# Patient Record
Sex: FEMALE | Race: WHITE | NOT HISPANIC OR LATINO | Employment: FULL TIME
[De-identification: names, ages, dates, MRNs, and addresses within clinical notes are randomized per-mention and may not be internally consistent; named-entity substitution may affect disease eponyms.]

---

## 2017-11-12 ENCOUNTER — HEALTH MAINTENANCE LETTER (OUTPATIENT)
Age: 27
End: 2017-11-12

## 2019-01-07 ENCOUNTER — OFFICE VISIT (OUTPATIENT)
Dept: FAMILY MEDICINE | Facility: CLINIC | Age: 29
End: 2019-01-07
Payer: COMMERCIAL

## 2019-01-07 VITALS
DIASTOLIC BLOOD PRESSURE: 72 MMHG | SYSTOLIC BLOOD PRESSURE: 120 MMHG | RESPIRATION RATE: 18 BRPM | BODY MASS INDEX: 22.29 KG/M2 | TEMPERATURE: 98.3 F | OXYGEN SATURATION: 99 % | HEART RATE: 82 BPM | HEIGHT: 67 IN | WEIGHT: 142 LBS

## 2019-01-07 DIAGNOSIS — Z71.84 TRAVEL ADVICE ENCOUNTER: ICD-10-CM

## 2019-01-07 DIAGNOSIS — Z32.01 POSITIVE PREGNANCY TEST: Primary | ICD-10-CM

## 2019-01-07 LAB — HCG UR QL: POSITIVE

## 2019-01-07 PROCEDURE — 81025 URINE PREGNANCY TEST: CPT | Performed by: NURSE PRACTITIONER

## 2019-01-07 PROCEDURE — 99213 OFFICE O/P EST LOW 20 MIN: CPT | Performed by: NURSE PRACTITIONER

## 2019-01-07 ASSESSMENT — MIFFLIN-ST. JEOR: SCORE: 1388.8

## 2019-01-07 ASSESSMENT — PAIN SCALES - GENERAL: PAINLEVEL: MILD PAIN (2)

## 2019-01-07 NOTE — PATIENT INSTRUCTIONS
-Take your prenatal vitamin every day (with 400-800 mcg of folic acid)  -Maintain a healthy weight  -Exercise regularly, eat a well-balanced diet, avoid alcohol and potentially dangerous chemicals, and drink caffeine only in moderation (<250 mg) daily  -Avoid tobacco products  -Avoid  ibuprofen, motrin, advil, aleve, or naproxen; can take tylenol (intermittently, not in excess) for pain  -Avoid scooping connie litter (if you do, wash hands thoroughly afterwards)  -Avoid raw meats and fish  -Heat lunch meat to steaming or avoid  -Eat fish just 2-3 times a week to avoid excess mercury  -Continue to exercise- this is good and healthy for you and your pregnancy.  Aim for 4-5 times a week of 30-45 minutes of moderate exercise  -Get enough sleep- aim for 8-9 hours a night and nap when needed  -Drink at least 10 glasses of water a day to maintain good hydration  -For nausea:      -Eat when you're hungry, empty stomach can aggravate nausea; eat small, frequent         meals      -High sugar foods can make nausea worse, aim for balance diet with sufficient protein     (20-30 grams of protein with every meal)      -Can take 10-25 mg of Vitamin B 6 every 6 hours as needed, do not exceed 200 mg         daily      -Can also do Doxylamine (unisom- available over the counter) plus Vitamin B6 25 mg     at night for sleep/nausea      -Accupunture can also be helpful    -for hemorrhoids:  over the counter creams and suppositories are safe, for constipation Colace 100 mg twice a day or Miralax daily are safe as well    If you experience any vaginal bleeding and/or cramping, please call us right away or go to emergency room if symptoms are severe.      At [facility], we strive to deliver an exceptional experience to you, every time we see you.  If you receive a survey in the mail, please send us back your thoughts. We really do value your feedback.    Based on your medical history, these are the current health  maintenance/preventive care services that you are due for (some may have been done at this visit.)  Health Maintenance Due   Topic Date Due     PHQ-2 Q1 YR  10/31/2000     HIV SCREEN (SYSTEM ASSIGNED)  10/31/2006     PAP Q3 YR  05/13/2016     INFLUENZA VACCINE (1) 09/01/2018         Suggested websites for health information:  Www.[employer].org : Up to date and easily searchable information on multiple topics.  Www.medlineplus.gov : medication info, interactive tutorials, watch real surgeries online  Www.familydoctor.org : good info from the Academy of Family Physicians  Www.cdc.gov : public health info, travel advisories, epidemics (H1N1)  Www.aap.org : children's health info, normal development, vaccinations  Www.health.[facility].[place].us : [place] dept of health, public health issues in [place], N1N1    Your care team:                            Family Medicine Internal Medicine   MD Andrew Johnson MD Shantel Branch-Fleming, MD Katya Georgiev PA-C Nam Ho, MD Pediatrics   RUTH Rangel, KEO Garza APRN CNP   MD Denise Selby MD Deborah Mielke, MD Kim Thein, APRN CNP      Clinic hours: Monday - Thursday 7 am-7 pm; Fridays 7 am-5 pm.   Urgent care: Monday - Friday 11 am-9 pm; Saturday and Sunday 9 am-5 pm.  Pharmacy : Monday -Thursday 8 am-8 pm; Friday 8 am-6 pm; Saturday and Sunday 9 am-5 pm.     Clinic: [phone]   Pharmacy: [phone]

## 2019-01-07 NOTE — PROGRESS NOTES
SUBJECTIVE:   Sonia Butler is a 28 year old female who presents to clinic today for the following health issues:    Concern:  Confirmation of Pregnancy - LMP 11/29/18. 1/4/18 home positive test. Started OTC prenatal vitamins. Patient states she was given Macrobid for recent UTI but only took x4 days and stopped last week. She states doesn't have any more UTi symptoms.  Symptoms:  Breast tenderness, abdominal cramping intermittent, not severe, denies nausea, vomiting, or spotting  EGA:  5 weeks 4 days  RAS: 9/5/2019  Menses were not regular after removal of Estrellita but patient thinks she ovulated around 14 day after last menses.    Planned pregnancy but she and spouse were hoping to wait to conceive until after trip to [facility] in Mid-February.  Patient unsure is she should go.    Problem list and histories reviewed & adjusted, as indicated.  Additional history: as documented    Patient Active Problem List   Diagnosis     CARDIOVASCULAR SCREENING; LDL GOAL LESS THAN 160     LSIL (low grade squamous intraepithelial lesion) on Pap smear     Screening examination for pulmonary tuberculosis     Past Surgical History:   Procedure Laterality Date     COLPOSCOPY CERVIX, BIOPSY CERVIX, ENDOCERVICAL CURETTAGE, COMBINED  2010    moderate dysplasia       Social History     Tobacco Use     Smoking status: Never Smoker     Smokeless tobacco: Never Used   Substance Use Topics     Alcohol use: Yes     Family History   Problem Relation Age of Onset     Diabetes No family hx of      Cancer No family hx of      Thyroid Disease No family hx of      Lupus No family hx of      Thrombophilia No family hx of      C.A.D. No family hx of      Psoriasis No family hx of      Eczema No family hx of      Melanoma No family hx of          No current outpatient medications on file.     No Known Allergies  Recent Labs   Lab Test 12/31/11  1011   LDL 53   HDL 45*   TRIG 52      BP Readings from Last 3 Encounters:   01/07/19 120/72   05/13/13 119/78  "  05/06/13 113/64    Wt Readings from Last 3 Encounters:   01/07/19 64.4 kg (142 lb)   05/13/13 64.5 kg (142 lb 3.2 oz)   05/06/13 63.5 kg (140 lb)                    Reviewed and updated as needed this visit by clinical staff  Tobacco  Allergies  Meds  Med Hx  Surg Hx  Fam Hx  Soc Hx      Reviewed and updated as needed this visit by Provider  Tobacco  Allergies  Meds  Med Hx  Surg Hx  Fam Hx  Soc Hx        ROS:  Constitutional, HEENT, cardiovascular, pulmonary, gi and gu systems are negative, except as otherwise noted.    OBJECTIVE:     /72 (BP Location: Left arm, Patient Position: Chair, Cuff Size: Adult Regular)   Pulse 82   Temp 98.3  F (36.8  C) (Oral)   Resp 18   Ht 1.689 m (5' 6.5\")   Wt 64.4 kg (142 lb)   LMP 11/29/2018 (Exact Date)   SpO2 99%   BMI 22.58 kg/m    Body mass index is 22.58 kg/m .  GENERAL: healthy, alert and no distress  NECK: no adenopathy, no asymmetry, masses, or scars and thyroid normal to palpation  RESP: lungs clear to auscultation - no rales, rhonchi or wheezes  CV: regular rate and rhythm, normal S1 S2, no S3 or S4, no murmur, click or rub, no peripheral edema and peripheral pulses strong  ABDOMEN: soft, nontender, no hepatosplenomegaly, no masses and bowel sounds normal  PSYCH: mentation appears normal, affect normal/bright    Diagnostic Test Results:  Results for orders placed or performed in visit on 01/07/19 (from the past 24 hour(s))   HCG Qual, Urine - [facility],  [place], [place]  ([id number])   Result Value Ref [place]    HCG Qual Urine Positive (A) NEG^Negative       ASSESSMENT/PLAN:     1. Positive pregnancy test  See HPI.  Patient to follow up with gynecology around 7-10 weeks.    - HCG Qual, Urine - [facility],  [place], [place]  ([id number])  - OB/GYN REFERRAL    2. Travel advice encounter  Patient would like to see travel clinic regarding planned trip to [place] [facility].  Did previously meet with them prior to pregnancy.  - TRAVEL CLINIC REFERRAL    Patient Instructions "     -Take your prenatal vitamin every day (with 400-800 mcg of folic acid)  -Maintain a healthy weight  -Exercise regularly, eat a well-balanced diet, avoid alcohol and potentially dangerous chemicals, and drink caffeine only in moderation (<250 mg) daily  -Avoid tobacco products  -Avoid  ibuprofen, motrin, advil, aleve, or naproxen; can take tylenol (intermittently, not in excess) for pain  -Avoid scooping connie litter (if you do, wash hands thoroughly afterwards)  -Avoid raw meats and fish  -Heat lunch meat to steaming or avoid  -Eat fish just 2-3 times a week to avoid excess mercury  -Continue to exercise- this is good and healthy for you and your pregnancy.  Aim for 4-5 times a week of 30-45 minutes of moderate exercise  -Get enough sleep- aim for 8-9 hours a night and nap when needed  -Drink at least 10 glasses of water a day to maintain good hydration  -For nausea:      -Eat when you're hungry, empty stomach can aggravate nausea; eat small, frequent         meals      -High sugar foods can make nausea worse, aim for balance diet with sufficient protein     (20-30 grams of protein with every meal)      -Can take 10-25 mg of Vitamin B 6 every 6 hours as needed, do not exceed 200 mg         daily      -Can also do Doxylamine (unisom- available over the counter) plus Vitamin B6 25 mg     at night for sleep/nausea      -Accupunture can also be helpful    -for hemorrhoids:  over the counter creams and suppositories are safe, for constipation Colace 100 mg twice a day or Miralax daily are safe as well    If you experience any vaginal bleeding and/or cramping, please call us right away or go to emergency room if symptoms are severe.      At [facility], we strive to deliver an exceptional experience to you, every time we see you.  If you receive a survey in the mail, please send us back your thoughts. We really do value your feedback.    Based on your medical history, these are the current health  maintenance/preventive care services that you are due for (some may have been done at this visit.)  Health Maintenance Due   Topic Date Due     PHQ-2 Q1 YR  10/31/2000     HIV SCREEN (SYSTEM ASSIGNED)  10/31/2006     PAP Q3 YR  05/13/2016     INFLUENZA VACCINE (1) 09/01/2018         Suggested websites for health information:  Www.Sunpreme.[employer]Beam : Up to date and easily searchable information on multiple topics.  Www.medlineplus.gov : medication info, interactive tutorials, watch real surgeries online  Www.familydoctor.org : good info from the Academy of Family Physicians  Www.cdc.gov : public health info, travel advisories, epidemics (H1N1)  Www.aap.org : children's health info, normal development, vaccinations  Www.health.[facility].mn.us : [place] dept of health, public health issues in [place], N1N1    Your care team:                            Family Medicine Internal Medicine   MD Andrew Johnson MD Shantel Branch-Fleming, MD Katya Georgiev PA-C Nam Ho, MD Pediatrics   RUTH Rangel, MD Denise Reeder CNP, MD Deborah Mielke, MD Kim Thein, APRN KEO      Clinic hours: Monday - Thursday 7 am-7 pm; Fridays 7 am-5 pm.   Urgent care: Monday - Friday 11 am-9 pm; Saturday and Sunday 9 am-5 pm.  Pharmacy : Monday -Thursday 8 am-8 pm; Friday 8 am-6 pm; Saturday and Sunday 9 am-5 pm.     Clinic: [phone]   Pharmacy: [phone]        Paty Armenta, APRN KEO  [facility]

## 2019-01-16 ENCOUNTER — MYC MEDICAL ADVICE (OUTPATIENT)
Dept: FAMILY MEDICINE | Facility: CLINIC | Age: 29
End: 2019-01-16

## 2019-01-16 DIAGNOSIS — O21.9 NAUSEA AND VOMITING DURING PREGNANCY: Primary | ICD-10-CM

## 2019-01-16 NOTE — TELEPHONE ENCOUNTER
Routing to provider to advise. Patient requesting anti-emetic for morning sickness.     [employer] message sent by patient as follows:  Hi Paty,   The morning sickness has hit hard. Having difficulty getting to work and through my day. We talked about trying an antiemetic. Is this something I could try? Pharmacy - [facility] in [place], [place]   Thanks,  Sonia Hinkle RN, BSN

## 2019-01-17 RX ORDER — ONDANSETRON 4 MG/1
4-8 TABLET, ORALLY DISINTEGRATING ORAL EVERY 6 HOURS PRN
Qty: 45 TABLET | Refills: 1 | Status: SHIPPED | OUTPATIENT
Start: 2019-01-17 | End: 2019-01-28

## 2019-02-04 ENCOUNTER — PRENATAL OFFICE VISIT (OUTPATIENT)
Dept: OBGYN | Facility: CLINIC | Age: 29
End: 2019-02-04
Payer: COMMERCIAL

## 2019-02-04 ENCOUNTER — ANCILLARY PROCEDURE (OUTPATIENT)
Dept: ULTRASOUND IMAGING | Facility: CLINIC | Age: 29
End: 2019-02-04
Payer: COMMERCIAL

## 2019-02-04 VITALS
WEIGHT: 135 LBS | OXYGEN SATURATION: 98 % | BODY MASS INDEX: 21.46 KG/M2 | SYSTOLIC BLOOD PRESSURE: 102 MMHG | HEART RATE: 77 BPM | DIASTOLIC BLOOD PRESSURE: 55 MMHG

## 2019-02-04 DIAGNOSIS — O21.9 NAUSEA AND VOMITING DURING PREGNANCY: ICD-10-CM

## 2019-02-04 DIAGNOSIS — Z34.80 SUPERVISION OF OTHER NORMAL PREGNANCY, ANTEPARTUM: ICD-10-CM

## 2019-02-04 DIAGNOSIS — Z34.81 ENCOUNTER FOR SUPERVISION OF OTHER NORMAL PREGNANCY, FIRST TRIMESTER: Primary | ICD-10-CM

## 2019-02-04 DIAGNOSIS — Z34.81 ENCOUNTER FOR SUPERVISION OF OTHER NORMAL PREGNANCY, FIRST TRIMESTER: ICD-10-CM

## 2019-02-04 LAB
ABO + RH BLD: NORMAL
ABO + RH BLD: NORMAL
BLD GP AB SCN SERPL QL: NORMAL
BLOOD BANK CMNT PATIENT-IMP: NORMAL
ERYTHROCYTE [DISTWIDTH] IN BLOOD BY AUTOMATED COUNT: 12.4 % (ref 10–15)
HBV SURFACE AG SERPL QL IA: NONREACTIVE
HCT VFR BLD AUTO: 36.3 % (ref 35–47)
HGB BLD-MCNC: 12.4 G/DL (ref 11.7–15.7)
HIV 1+2 AB+HIV1 P24 AG SERPL QL IA: NONREACTIVE
MCH RBC QN AUTO: 30.8 PG (ref 26.5–33)
MCHC RBC AUTO-ENTMCNC: 34.2 G/DL (ref 31.5–36.5)
MCV RBC AUTO: 90 FL (ref 78–100)
PLATELET # BLD AUTO: 196 10E9/L (ref 150–450)
RBC # BLD AUTO: 4.03 10E12/L (ref 3.8–5.2)
SPECIMEN EXP DATE BLD: NORMAL
WBC # BLD AUTO: 9.1 10E9/L (ref 4–11)

## 2019-02-04 PROCEDURE — 86850 RBC ANTIBODY SCREEN: CPT | Performed by: OBSTETRICS & GYNECOLOGY

## 2019-02-04 PROCEDURE — 99207 ZZC FIRST OB VISIT: CPT | Performed by: OBSTETRICS & GYNECOLOGY

## 2019-02-04 PROCEDURE — 36415 COLL VENOUS BLD VENIPUNCTURE: CPT | Performed by: OBSTETRICS & GYNECOLOGY

## 2019-02-04 PROCEDURE — 76801 OB US < 14 WKS SINGLE FETUS: CPT | Performed by: RADIOLOGY

## 2019-02-04 PROCEDURE — 86900 BLOOD TYPING SEROLOGIC ABO: CPT | Performed by: OBSTETRICS & GYNECOLOGY

## 2019-02-04 PROCEDURE — 87086 URINE CULTURE/COLONY COUNT: CPT | Performed by: OBSTETRICS & GYNECOLOGY

## 2019-02-04 PROCEDURE — 87389 HIV-1 AG W/HIV-1&-2 AB AG IA: CPT | Performed by: OBSTETRICS & GYNECOLOGY

## 2019-02-04 PROCEDURE — 85027 COMPLETE CBC AUTOMATED: CPT | Performed by: OBSTETRICS & GYNECOLOGY

## 2019-02-04 PROCEDURE — 86762 RUBELLA ANTIBODY: CPT | Performed by: OBSTETRICS & GYNECOLOGY

## 2019-02-04 PROCEDURE — 86901 BLOOD TYPING SEROLOGIC RH(D): CPT | Performed by: OBSTETRICS & GYNECOLOGY

## 2019-02-04 PROCEDURE — 86780 TREPONEMA PALLIDUM: CPT | Performed by: OBSTETRICS & GYNECOLOGY

## 2019-02-04 PROCEDURE — 87340 HEPATITIS B SURFACE AG IA: CPT | Performed by: OBSTETRICS & GYNECOLOGY

## 2019-02-04 RX ORDER — MEFLOQUINE HYDROCHLORIDE 250 MG/1
TABLET ORAL
COMMUNITY
Start: 2019-01-28 | End: 2019-06-24

## 2019-02-04 NOTE — PATIENT INSTRUCTIONS
If you have any questions regarding your visit, Please contact your care team.     [employer]: [phone]    [facility] CLINIC HOURS TELEPHONE NUMBER       LIEN Garner-    Ramona De La Rosa-Medical Assistant       Monday-[place]  8:00a.m-4:45 p.m  Tuesday-[place]  9:00a.m-11:45 a.m  Wednesday-[place] 8:00a.m-4:45 p.m.  Thursday-[place]  8:00a.m-4:45 p.m.  Friday-[place]  8:00a.m-4:45 p.m. [facility]  [address] [place] [address]  [phone] ask [facility]  [phone] Fax  Imaging [phone]    [facility] Labor [facility] Delivery  [address] [place] [address] [place] [address]  [phone]    [facility]  [address] [place]  [place] [address]  [phone] ask [facility]  [phone] Fax  Imaging [phone]     Urgent Care locations:    [facility] Monday-Friday  5 pm - 9 pm  Saturday and Sunday   9 am - 5 pm    Monday-Friday   11 am - 9 pm  Saturday and Sunday   9 am - 5 pm   [phone]       If you need a medication refill, please contact your pharmacy. Please allow 3 business days for your refill to be completed.  As always, Thank you for trusting us with your healthcare needs!

## 2019-02-05 LAB
BACTERIA SPEC CULT: NORMAL
RUBV IGG SERPL IA-ACNC: 51 IU/ML
SPECIMEN SOURCE: NORMAL
T PALLIDUM AB SER QL: NONREACTIVE

## 2019-02-06 PROBLEM — Z34.80 SUPERVISION OF OTHER NORMAL PREGNANCY, ANTEPARTUM: Status: ACTIVE | Noted: 2019-02-06

## 2019-02-06 PROBLEM — O21.9 NAUSEA AND VOMITING DURING PREGNANCY: Status: ACTIVE | Noted: 2019-02-06

## 2019-02-06 NOTE — PROGRESS NOTES
Sonia Butler is a 28 year old year old G 3 P 0 who presents for an initial obstetrical visit.  Referred by Paty Armenta NP.    Currently experiencing normal pregnancy symptoms without particular problems including pain, bleeding, marked vomiting or weight loss except: mod N/V daily but low dose Zofran helps.  This was a planned pregnancy. Menses regular and Estrellita IUD was removed five months ago.  Here today with .  Additional concerns: some chronic low back and hip pains, hemorrhoids, past loop electrosurgical excision procedure, [place] trip planned in 2 weeks and discussed Zika risk and considerations. Also had Travel Clinic visit.      ROS:     Systems reviewed include constitutional; breast;                 cardiac; respiratory; gastrointestinal; genitourinary;                                musculoskeletal; integumentary; psychological;                                hematologic/lymphatic and endocrine.                  These systems were negative for significant symptoms except                 for the following: none and see above HPI.    Past medical, obstetrical, surgical, family and social history reviewed and as noted or updated in chart.     Allergies, meds and supplements are as noted or updated in chart.                    Episode OB dating, demographic and history forms completed or reviewed.    EXAM:  VS as noted. BMI- Body mass index is 21.46 kg/m .    Relatively recent normal general exam- not repeated now.       ASSESSMENT: (Z34.81) Encounter for supervision of other normal pregnancy, first trimester  (primary encounter diagnosis)  Comment:   Plan: ABO/Rh type and screen, CBC with platelets,         Hepatitis B surface antigen, Rubella Antibody         IgG Quantitative, Urine Culture Aerobic         Bacterial, HIV Antigen Antibody Combo,         Treponema Abs w Reflex to RPR and Titer, US OB         < 14 Weeks Single            (Z34.80) Supervision of other normal pregnancy,  antepartum  Comment:   Plan:     (O21.9) Nausea and vomiting during pregnancy  Comment:   Plan:        PLAN:  Advice appropriate to gestational age reviewed including pertinent Checklist items. Discussed condition, tests and general care plan. Symptoms, problems and concerns reviewed. Recommended weight gain discussed. Problem list initiated.   Check ultrasound now. Pap due postpartum. Orders as noted. RTC in 4 weeks. Discuss special screening tests next.    Total encounter time (physician together with patient) = 30min. Direct counseling, education and care coordination time (physician together with patient) = 20min.      Blair Cope MD

## 2019-03-04 ENCOUNTER — PRENATAL OFFICE VISIT (OUTPATIENT)
Dept: OBGYN | Facility: CLINIC | Age: 29
End: 2019-03-04
Payer: COMMERCIAL

## 2019-03-04 VITALS
BODY MASS INDEX: 20.83 KG/M2 | HEART RATE: 67 BPM | DIASTOLIC BLOOD PRESSURE: 63 MMHG | WEIGHT: 131 LBS | SYSTOLIC BLOOD PRESSURE: 112 MMHG | TEMPERATURE: 97.2 F

## 2019-03-04 DIAGNOSIS — Z34.80 SUPERVISION OF OTHER NORMAL PREGNANCY, ANTEPARTUM: ICD-10-CM

## 2019-03-04 DIAGNOSIS — O21.9 NAUSEA AND VOMITING DURING PREGNANCY: ICD-10-CM

## 2019-03-04 PROCEDURE — 99207 ZZC PRENATAL VISIT: CPT | Performed by: OBSTETRICS & GYNECOLOGY

## 2019-03-04 NOTE — PATIENT INSTRUCTIONS
If you have any questions regarding your visit, Please contact your care team.     [employer]: [phone]  [facility] Health CLINIC HOURS TELEPHONE NUMBER       Blair Cope M.D.        Kay-    RN-      Omni-Medical Assistant       [facility]  8:00a.m-4:45 p.m  Tuesday-[place]  9:00a.m-4:00 p.m  Wednesday-[place] 8:00a.m-4:45 p.m.  Thursday-[place]  8:00a.m-4:45 p.m.  Friday-[place]  8:00a.m-4:45 p.m. [facility]  [address]  [place], [place] [address] [phone] ask [facility]  [phone] Fax  Imaging [phone]    [facility] Labor and Delivery  [address] [place] [address] Dr.  [place], [place] [address] [phone]    [facility]  [address] [place] [address]  [phone] ask [facility]  [phone] Fax  Imaging [phone]     Urgent Care locations:    [place]        [place] Monday-Friday  5 pm - 9 pm  Saturday and Sunday   9 am - 5 pm  Monday-Friday   11 am - 9 pm  Saturday and Sunday   9 am - 5 pm   [phone]   If you need a medication refill, please contact your pharmacy. Please allow 3 business days for your refill to be completed.  As always, Thank you for trusting us with your healthcare needs!

## 2019-03-05 NOTE — PROGRESS NOTES
No signif signs, symptoms or concerns. Some constipation. N/V improving. Trip went well. Here with  who has sinusitis. Advice appropriate to gestational age reviewed including pertinent Checklist items. Discussed condition, tests and care plan including RBA. Problem list updated. Discuss special screening tests next.  A/P:  Sonia was seen today for prenatal care.    Diagnoses and all orders for this visit:    Supervision of other normal pregnancy, antepartum    Nausea and vomiting during pregnancy        Blair Cope MD

## 2019-04-01 ENCOUNTER — PRENATAL OFFICE VISIT (OUTPATIENT)
Dept: OBGYN | Facility: CLINIC | Age: 29
End: 2019-04-01
Payer: COMMERCIAL

## 2019-04-01 VITALS
SYSTOLIC BLOOD PRESSURE: 111 MMHG | HEART RATE: 68 BPM | DIASTOLIC BLOOD PRESSURE: 63 MMHG | WEIGHT: 139 LBS | OXYGEN SATURATION: 100 % | BODY MASS INDEX: 22.1 KG/M2

## 2019-04-01 DIAGNOSIS — O21.9 NAUSEA AND VOMITING DURING PREGNANCY: ICD-10-CM

## 2019-04-01 DIAGNOSIS — Z34.80 SUPERVISION OF OTHER NORMAL PREGNANCY, ANTEPARTUM: ICD-10-CM

## 2019-04-01 PROCEDURE — 36415 COLL VENOUS BLD VENIPUNCTURE: CPT | Performed by: OBSTETRICS & GYNECOLOGY

## 2019-04-01 PROCEDURE — 81511 FTL CGEN ABNOR FOUR ANAL: CPT | Mod: 90 | Performed by: OBSTETRICS & GYNECOLOGY

## 2019-04-01 PROCEDURE — 99000 SPECIMEN HANDLING OFFICE-LAB: CPT | Performed by: OBSTETRICS & GYNECOLOGY

## 2019-04-01 PROCEDURE — 99207 ZZC PRENATAL VISIT: CPT | Performed by: OBSTETRICS & GYNECOLOGY

## 2019-04-01 NOTE — PATIENT INSTRUCTIONS
If you have any questions regarding your visit, Please contact your care team.     [place] [place] [employer]: [phone]  [facility] Health CLINIC HOURS TELEPHONE NUMBER       Blair Cope M.D.        Kay-    RN-      Omni-Medical Assistant       [facility]  8:00a.m-4:45 p.m  Tuesday-[place]  9:00a.m-4:00 p.m  Wednesday-[place] 8:00a.m-4:45 p.m.  Thursday-[place]  8:00a.m-4:45 p.m.  Friday-[place]  8:00a.m-4:45 p.m. [facility]  [address]  [place], [place] [address] [phone] ask [facility]  [phone] Fax  Imaging [phone]    [facility] Delivery  [address] [place] [address] Dr.  [place], [place] [address] [phone]    [facility]  [address] [place] [address]  [phone] ask [facility]  [phone] Fax  Imaging [phone]     Urgent Care locations:    [place]        [place] Monday-Friday  5 pm - 9 pm  Saturday and Sunday   9 am - 5 pm  Monday-Friday   11 am - 9 pm  Saturday and Sunday   9 am - 5 pm   [phone]   If you need a medication refill, please contact your pharmacy. Please allow 3 business days for your refill to be completed.  As always, Thank you for trusting us with your healthcare needs!

## 2019-04-02 NOTE — PROGRESS NOTES
No signif signs, symptoms or concerns except continued constipation and some nausea. Here with .   Discussed special diagnostic and screening tests and plans (y = yes, n = no/declined, p = possibly/considering): first trimester scr with NT and later AFP or with cell free DNA and later AFP = n, cell free DNA= n, CF carrier scr = n, hemoglobinopathy scr= n, SMA, fragile X  and other genetic scr= n, genetic amnio/CVS = n, quad scr = y, survey u/s = y, Level 2 survey u/s with MFM = n.  Advice appropriate to gestational age reviewed including pertinent Checklist items. Discussed condition, tests and care plan including RBA. Problem list updated. Survey ultrasound next.  A/P:  Sonia was seen today for prenatal care.    Diagnoses and all orders for this visit:    Supervision of other normal pregnancy, antepartum  -     Maternal Quad Marker 2nd Trimester  -     US OB > 14 Weeks; Future    Nausea and vomiting during pregnancy        Blair Cope MD

## 2019-04-04 LAB
# FETUSES US: NORMAL
# FETUSES: NORMAL
AFP ADJ MOM AMN: 1.06
AFP SERPL-MCNC: 45 NG/ML
AGE - REPORTED: 30.8 YR
CURRENT SMOKER: NO
CURRENT SMOKER: NO
DIABETES STATUS PATIENT: NO
FAMILY MEMBER DISEASES HX: NO
FAMILY MEMBER DISEASES HX: NO
GA METHOD: NORMAL
GA METHOD: NORMAL
GA: NORMAL WK
HCG MOM SERPL: 2.36
HCG SERPL-ACNC: NORMAL IU/L
HX OF HEREDITARY DISORDERS: NO
IDDM PATIENT QL: NO
INHIBIN A MOM SERPL: 1.01
INHIBIN A SERPL-MCNC: 164 PG/ML
INTEGRATED SCN PATIENT-IMP: NORMAL
IVF PREGNANCY: NO
LMP START DATE: NORMAL
MONOCHORIONIC TWINS: NO
PATHOLOGY STUDY: NORMAL
PREV FETUS DEFECT: NO
SERVICE CMNT-IMP: NO
SPECIMEN DRAWN SERPL: NORMAL
U ESTRIOL MOM SERPL: 1.45
U ESTRIOL SERPL-MCNC: 1.96 NG/ML
VALPROIC/CARBAMAZEPINE STATUS: NO
WEIGHT UNITS: NORMAL

## 2019-05-06 ENCOUNTER — PRENATAL OFFICE VISIT (OUTPATIENT)
Dept: OBGYN | Facility: CLINIC | Age: 29
End: 2019-05-06
Payer: COMMERCIAL

## 2019-05-06 ENCOUNTER — ANCILLARY PROCEDURE (OUTPATIENT)
Dept: ULTRASOUND IMAGING | Facility: CLINIC | Age: 29
End: 2019-05-06
Attending: OBSTETRICS & GYNECOLOGY
Payer: COMMERCIAL

## 2019-05-06 VITALS
DIASTOLIC BLOOD PRESSURE: 66 MMHG | HEART RATE: 74 BPM | WEIGHT: 144.4 LBS | SYSTOLIC BLOOD PRESSURE: 104 MMHG | BODY MASS INDEX: 22.96 KG/M2

## 2019-05-06 DIAGNOSIS — Z34.80 SUPERVISION OF OTHER NORMAL PREGNANCY, ANTEPARTUM: ICD-10-CM

## 2019-05-06 DIAGNOSIS — O21.9 NAUSEA AND VOMITING DURING PREGNANCY: ICD-10-CM

## 2019-05-06 PROCEDURE — 99207 ZZC PRENATAL VISIT: CPT | Performed by: OBSTETRICS & GYNECOLOGY

## 2019-05-06 PROCEDURE — 76805 OB US >/= 14 WKS SNGL FETUS: CPT | Performed by: RADIOLOGY

## 2019-05-06 NOTE — PATIENT INSTRUCTIONS
If you have any questions regarding your visit, Please contact your care team.     [employer]: [phone]  [facility] Health CLINIC HOURS TELEPHONE NUMBER       Blair Cope M.D.        Kay-    Johanna De La Rosa-Medical Assistant       Monday-[place]  8:00a.m-4:45 p.m  Tuesday-[place]  9:00a.m-4:00 p.m  Wednesday-[place] 8:00a.m-4:45 p.m.  Thursday-[place]  8:00a.m-4:45 p.m.  Friday-[place]  8:00a.m-4:45 p.m. [facility]  [address] [place] [address]  [phone] ask for [facility]  [phone] Fax  Imaging [phone]    [facility] Labor and Delivery  [address] [place] [address] [place] [address]  [phone]    [facility] [place] [facility]  [address] [place] [address]  [phone] ask [facility]  [phone] Fax  Imaging [phone]     Urgent Care locations:    [facility] Monday-Friday  5 pm - 9 pm  Saturday and Sunday   9 am - 5 pm  Monday-Friday   11 am - 9 pm  Saturday and Sunday   9 am - 5 pm   [phone]   If you need a medication refill, please contact your pharmacy. Please allow 3 business days for your refill to be completed.  As always, Thank you for trusting us with your healthcare needs!

## 2019-05-07 NOTE — PROGRESS NOTES
No signif signs, symptoms or concerns. Nausea improved. Ultrasound survey was today and normal. Here with . Advice appropriate to gestational age reviewed including pertinent Checklist items. Discussed condition, tests and care plan including RBA. Problem list updated. 1h GTT next.  A/P:  Sonia was seen today for prenatal care.    Diagnoses and all orders for this visit:    Supervision of other normal pregnancy, antepartum    Nausea and vomiting during pregnancy        Blair Cope MD

## 2019-06-04 ENCOUNTER — PRENATAL OFFICE VISIT (OUTPATIENT)
Dept: OBGYN | Facility: CLINIC | Age: 29
End: 2019-06-04
Payer: COMMERCIAL

## 2019-06-04 VITALS
BODY MASS INDEX: 23.05 KG/M2 | DIASTOLIC BLOOD PRESSURE: 66 MMHG | SYSTOLIC BLOOD PRESSURE: 115 MMHG | TEMPERATURE: 97.5 F | WEIGHT: 145 LBS | HEART RATE: 76 BPM

## 2019-06-04 DIAGNOSIS — Z34.80 SUPERVISION OF OTHER NORMAL PREGNANCY, ANTEPARTUM: ICD-10-CM

## 2019-06-04 DIAGNOSIS — O21.9 NAUSEA AND VOMITING DURING PREGNANCY: ICD-10-CM

## 2019-06-04 LAB
GLUCOSE 1H P 50 G GLC PO SERPL-MCNC: 163 MG/DL (ref 60–129)
HGB BLD-MCNC: 11.1 G/DL (ref 11.7–15.7)

## 2019-06-04 PROCEDURE — 36415 COLL VENOUS BLD VENIPUNCTURE: CPT | Performed by: OBSTETRICS & GYNECOLOGY

## 2019-06-04 PROCEDURE — 82950 GLUCOSE TEST: CPT | Performed by: OBSTETRICS & GYNECOLOGY

## 2019-06-04 PROCEDURE — 00000218 ZZHCL STATISTIC OBHBG - HEMOGLOBIN: Performed by: OBSTETRICS & GYNECOLOGY

## 2019-06-04 PROCEDURE — 99207 ZZC PRENATAL VISIT: CPT | Performed by: OBSTETRICS & GYNECOLOGY

## 2019-06-04 NOTE — PROGRESS NOTES
No signif signs, symptoms or concerns except some left side pain with movements and diastasis recti. SGA- check ultrasound for fetal growth. Advice appropriate to gestational age reviewed including pertinent Checklist items. Discussed condition, tests and care plan including RBA. Problem list updated.   A/P:  Sonia was seen today for prenatal care.    Diagnoses and all orders for this visit:    Supervision of other normal pregnancy, antepartum  -     Glucose tolerance gest screen 1 hour  -     OB hemoglobin  -     US OB > 14 Weeks; Future    Nausea and vomiting during pregnancy        Blair Cope MD

## 2019-06-04 NOTE — PATIENT INSTRUCTIONS
If you have any questions regarding your visit, Please contact your care team.     [employer]: [phone]  [facility] Health CLINIC HOURS TELEPHONE NUMBER       Blair Cope M.D.        Kay-    Johanna De La Rosa-Medical Assistant       Monday-[place]  8:00a.m-4:45 p.m  Tuesday-[place]  9:00a.m-4:00 p.m  Wednesday-[place] 8:00a.m-4:45 p.m.  Thursday-[place]  8:00a.m-4:45 p.m.  Friday-[place]  8:00a.m-4:45 p.m. [facility]  [address] [place] [address]  [phone] ask for [facility]  [phone] Fax  Imaging [phone]    [facility] Labor [facility] Delivery  [address] [place] [address] [place] [address]  [phone]    [facility]  [address] [place] [address]  [phone] ask [facility]  [phone] Fax  Imaging [phone]     Urgent Care locations:    [facility] Monday-Friday  5 pm - 9 pm  Saturday and Sunday   9 am - 5 pm  Monday-Friday   11 am - 9 pm  Saturday and Sunday   9 am - 5 pm   [phone]   If you need a medication refill, please contact your pharmacy. Please allow 3 business days for your refill to be completed.  As always, Thank you for trusting us with your healthcare needs!

## 2019-06-10 ENCOUNTER — ANCILLARY PROCEDURE (OUTPATIENT)
Dept: ULTRASOUND IMAGING | Facility: CLINIC | Age: 29
End: 2019-06-10
Attending: OBSTETRICS & GYNECOLOGY
Payer: COMMERCIAL

## 2019-06-10 DIAGNOSIS — Z34.80 SUPERVISION OF OTHER NORMAL PREGNANCY, ANTEPARTUM: ICD-10-CM

## 2019-06-10 LAB
GLUCOSE 1H P 100 G GLC PO SERPL-MCNC: 114 MG/DL (ref 60–179)
GLUCOSE 2H P 100 G GLC PO SERPL-MCNC: 99 MG/DL (ref 60–154)
GLUCOSE 3H P 100 G GLC PO SERPL-MCNC: 72 MG/DL (ref 60–139)
GLUCOSE BLDC GLUCOMTR-MCNC: 69 MG/DL (ref 70–99)
GLUCOSE P FAST SERPL-MCNC: 78 MG/DL (ref 60–94)

## 2019-06-10 PROCEDURE — 76816 OB US FOLLOW-UP PER FETUS: CPT | Performed by: RADIOLOGY

## 2019-06-10 PROCEDURE — 36415 COLL VENOUS BLD VENIPUNCTURE: CPT | Performed by: OBSTETRICS & GYNECOLOGY

## 2019-06-10 PROCEDURE — 82952 GTT-ADDED SAMPLES: CPT | Performed by: OBSTETRICS & GYNECOLOGY

## 2019-06-10 PROCEDURE — 82951 GLUCOSE TOLERANCE TEST (GTT): CPT | Performed by: OBSTETRICS & GYNECOLOGY

## 2019-06-24 ENCOUNTER — PRENATAL OFFICE VISIT (OUTPATIENT)
Dept: OBGYN | Facility: CLINIC | Age: 29
End: 2019-06-24
Payer: COMMERCIAL

## 2019-06-24 VITALS
HEIGHT: 67 IN | HEART RATE: 74 BPM | DIASTOLIC BLOOD PRESSURE: 68 MMHG | BODY MASS INDEX: 23.48 KG/M2 | WEIGHT: 149.6 LBS | SYSTOLIC BLOOD PRESSURE: 111 MMHG | TEMPERATURE: 98.1 F | OXYGEN SATURATION: 99 %

## 2019-06-24 DIAGNOSIS — Z34.80 SUPERVISION OF OTHER NORMAL PREGNANCY, ANTEPARTUM: Primary | ICD-10-CM

## 2019-06-24 DIAGNOSIS — Z23 NEED FOR TDAP VACCINATION: ICD-10-CM

## 2019-06-24 PROCEDURE — 90471 IMMUNIZATION ADMIN: CPT | Performed by: NURSE PRACTITIONER

## 2019-06-24 PROCEDURE — 99207 ZZC PRENATAL VISIT: CPT | Performed by: NURSE PRACTITIONER

## 2019-06-24 PROCEDURE — 90715 TDAP VACCINE 7 YRS/> IM: CPT | Performed by: NURSE PRACTITIONER

## 2019-06-24 ASSESSMENT — MIFFLIN-ST. JEOR: SCORE: 1427.24

## 2019-06-24 ASSESSMENT — PAIN SCALES - GENERAL: PAINLEVEL: NO PAIN (0)

## 2019-06-24 NOTE — PROGRESS NOTES
Screening Questionnaire for Adult Immunization    Are you sick today?   No   Do you have allergies to medications, food, a vaccine component or latex?   No   Have you ever had a serious reaction after receiving a vaccination?   No   Do you have a long-term health problem with heart disease, lung disease, asthma, kidney disease, metabolic disease (e.g. diabetes), anemia, or other blood disorder?   No   Do you have cancer, leukemia, HIV/AIDS, or any other immune system problem?   No   In the past 3 months, have you taken medications that affect  your immune system, such as prednisone, other steroids, or anticancer drugs; drugs for the treatment of rheumatoid arthritis, Crohn s disease, or psoriasis; or have you had radiation treatments?   No   Have you had a seizure, or a brain or other nervous system problem?   No   During the past year, have you received a transfusion of blood or blood     products, or been given immune (gamma) globulin or antiviral drug?   No   For women: Are you pregnant or is there a chance you could become        pregnant during the next month?   Yes   Have you received any vaccinations in the past 4 weeks?   No     Immunization questionnaire was positive for at least one answer.  Notified Elizabeth GUARDADO CNP.        Per orders of Elizabeth GUARDADO CNP, injection of Tdap given by Angela Fulton. Patient instructed to remain in clinic for 15 minutes afterwards, and to report any adverse reaction to me immediately.       Screening performed by Angela Fulton on 6/24/2019 at 9:31 AM.

## 2019-06-24 NOTE — PROGRESS NOTES
Patient presents for routine prenatal visit. Prenatal flowsheet reviewed and updated as needed.  Denies vaginal bleeding, loss of fluid, contractions or cramping.  Patient without complaint. Tdap given today.  Follow up ultrasound showed baby is approximately 56th%ile. FH remains behind, but adequate weight gain since last visit. Continue to monitor and consider ultrasound for growth in few weeks.    Advice as per Anticipatory Guidance/Checklist updated.  PE: See OB vitals    Questions asked and answered. Next OB visit in 2 week(s) with Dr. Cope.    Elizabeth GUARDADO CNP

## 2019-07-08 ENCOUNTER — PRENATAL OFFICE VISIT (OUTPATIENT)
Dept: OBGYN | Facility: CLINIC | Age: 29
End: 2019-07-08
Payer: COMMERCIAL

## 2019-07-08 ENCOUNTER — ANCILLARY PROCEDURE (OUTPATIENT)
Dept: ULTRASOUND IMAGING | Facility: CLINIC | Age: 29
End: 2019-07-08
Attending: NURSE PRACTITIONER
Payer: COMMERCIAL

## 2019-07-08 VITALS
BODY MASS INDEX: 24.01 KG/M2 | HEART RATE: 87 BPM | SYSTOLIC BLOOD PRESSURE: 115 MMHG | DIASTOLIC BLOOD PRESSURE: 72 MMHG | HEIGHT: 67 IN | WEIGHT: 153 LBS | OXYGEN SATURATION: 97 % | TEMPERATURE: 98.3 F

## 2019-07-08 DIAGNOSIS — M79.662 PAIN OF LEFT CALF: ICD-10-CM

## 2019-07-08 DIAGNOSIS — Z34.80 SUPERVISION OF OTHER NORMAL PREGNANCY, ANTEPARTUM: Primary | ICD-10-CM

## 2019-07-08 PROCEDURE — 93971 EXTREMITY STUDY: CPT | Mod: LT

## 2019-07-08 PROCEDURE — 99207 ZZC PRENATAL VISIT: CPT | Performed by: NURSE PRACTITIONER

## 2019-07-08 ASSESSMENT — MIFFLIN-ST. JEOR: SCORE: 1442.66

## 2019-07-08 ASSESSMENT — PAIN SCALES - GENERAL: PAINLEVEL: MILD PAIN (2)

## 2019-07-08 NOTE — PROGRESS NOTES
Patient presents for routine prenatal visit. Prenatal flowsheet reviewed and updated as needed.  Denies vaginal bleeding, loss of fluid, contractions or cramping. Denies HA, N/V, RUQ pain and vision changes.    Patient drove to [place] and back this past weekend, did get out every 90 minutes to walk around. Experiencing left calf pain. No redness, warmth, palpable abnormalities.   Exam is normal except tenderness of the calf with palpation. For her own reassurance, would like to have LE ultrasound done to rule out DVT and this is ordered. Warning signs to monitor for and report immediately discussed with patient and she verbalizes understanding.    Fundal height remains behind, but has gained approximately 4 lbs since last visit, will continue to monitor closely.    Advice as per Anticipatory Guidance/Checklist updated.  PE: See OB vitals    Questions asked and answered. Next OB visit in 2 week(s) with Dr. Cope.    Elizabeth GUARDADO CNP

## 2019-07-22 ENCOUNTER — ANCILLARY PROCEDURE (OUTPATIENT)
Dept: ULTRASOUND IMAGING | Facility: CLINIC | Age: 29
End: 2019-07-22
Attending: OBSTETRICS & GYNECOLOGY
Payer: COMMERCIAL

## 2019-07-22 ENCOUNTER — PRENATAL OFFICE VISIT (OUTPATIENT)
Dept: OBGYN | Facility: CLINIC | Age: 29
End: 2019-07-22
Payer: COMMERCIAL

## 2019-07-22 VITALS
BODY MASS INDEX: 23.99 KG/M2 | WEIGHT: 152 LBS | HEART RATE: 107 BPM | DIASTOLIC BLOOD PRESSURE: 61 MMHG | OXYGEN SATURATION: 98 % | SYSTOLIC BLOOD PRESSURE: 103 MMHG

## 2019-07-22 DIAGNOSIS — Z34.80 SUPERVISION OF OTHER NORMAL PREGNANCY, ANTEPARTUM: ICD-10-CM

## 2019-07-22 PROBLEM — O21.9 NAUSEA AND VOMITING DURING PREGNANCY: Status: RESOLVED | Noted: 2019-02-06 | Resolved: 2019-07-22

## 2019-07-22 PROCEDURE — 99207 ZZC PRENATAL VISIT: CPT | Performed by: OBSTETRICS & GYNECOLOGY

## 2019-07-22 PROCEDURE — 76816 OB US FOLLOW-UP PER FETUS: CPT | Performed by: RADIOLOGY

## 2019-07-22 NOTE — PATIENT INSTRUCTIONS
If you have any questions regarding your visit, Please contact your care team.     [facility]: [phone]  [facility] CLINIC HOURS TELEPHONE NUMBER       Blair Cope M.D.        Kay-    Johanna De La Rosa-Medical Assistant       Monday-[place]  8:00a.m-4:45 p.m  Tuesday-[place]  9:00a.m-4:00 p.m  Wednesday-[place] 8:00a.m-4:45 p.m.  Thursday-[place]  8:00a.m-4:45 p.m.  Friday-[place]  8:00a.m-4:45 p.m. [facility]  [address] [place] [address]  [phone] ask for [facility]  [phone] Fax  Imaging [phone]    [facility] and Delivery  [address] [place] [address] [place] [address]  [phone]    [facility]  [address] [place] [address]  [phone] ask [facility]  [phone] Fax  Imaging [phone]     Urgent Care locations:    [facility] Monday-Friday  5 pm - 9 pm  Saturday and Sunday   9 am - 5 pm  Monday-Friday   11 am - 9 pm  Saturday and Sunday   9 am - 5 pm   [phone]   If you need a medication refill, please contact your pharmacy. Please allow 3 business days for your refill to be completed.  As always, Thank you for trusting us with your healthcare needs!

## 2019-07-22 NOTE — PROGRESS NOTES
No signif signs, symptoms or concerns except SGA still and will check a follow-up ultrasound. Considering cord blood donation- discussed. Advice appropriate to gestational age reviewed including pertinent Checklist items. Discussed condition, tests and care plan including RBA. Problem list updated. GBS next.  A/P:  Sonia was seen today for prenatal care.    Diagnoses and all orders for this visit:    Supervision of other normal pregnancy, antepartum  -     US OB > 14 Weeks; Future        Total encounter time (physician together with patient) = 15min. Direct counseling, education and care coordination time (physician together with patient) = 10min.  Blair Cope MD

## 2019-08-05 ENCOUNTER — PRENATAL OFFICE VISIT (OUTPATIENT)
Dept: OBGYN | Facility: CLINIC | Age: 29
End: 2019-08-05
Payer: COMMERCIAL

## 2019-08-05 VITALS
HEIGHT: 67 IN | TEMPERATURE: 98.2 F | DIASTOLIC BLOOD PRESSURE: 64 MMHG | BODY MASS INDEX: 24.39 KG/M2 | HEART RATE: 86 BPM | SYSTOLIC BLOOD PRESSURE: 109 MMHG | WEIGHT: 155.4 LBS | OXYGEN SATURATION: 96 %

## 2019-08-05 DIAGNOSIS — Z34.80 SUPERVISION OF OTHER NORMAL PREGNANCY, ANTEPARTUM: Primary | ICD-10-CM

## 2019-08-05 PROCEDURE — 87653 STREP B DNA AMP PROBE: CPT | Performed by: NURSE PRACTITIONER

## 2019-08-05 PROCEDURE — 99207 ZZC PRENATAL VISIT: CPT | Performed by: NURSE PRACTITIONER

## 2019-08-05 RX ORDER — BREAST PUMP
1 EACH MISCELLANEOUS PRN
Qty: 1 EACH | Refills: 0 | Status: SHIPPED | OUTPATIENT
Start: 2019-08-05 | End: 2019-08-26

## 2019-08-05 ASSESSMENT — PAIN SCALES - GENERAL: PAINLEVEL: NO PAIN (0)

## 2019-08-05 ASSESSMENT — MIFFLIN-ST. JEOR: SCORE: 1453.55

## 2019-08-05 NOTE — PROGRESS NOTES
Patient presents for routine prenatal visit. Prenatal flowsheet reviewed and updated as needed.  Denies vaginal bleeding, loss of fluid, contractions or cramping. Denies HA, N/V, RUQ pain and vision changes.  Patient without complaint. GBS completed.  FH still behind. Patient had growth ultrasound after last appointment and EFW 57th percentile. Will continue to monitor-can consider repeat growth ultrasound in 1-2 weeks if needed.  Prescription given for breast pump.    Advice as per Anticipatory Guidance/Checklist updated.  PE: See OB vitals    Questions asked and answered. Next OB visit in 1 week(s) with Dr. Cope.    Elizabeth GUARDADO CNP

## 2019-08-06 LAB
GP B STREP DNA SPEC QL NAA+PROBE: NEGATIVE
SPECIMEN SOURCE: NORMAL

## 2019-08-12 ENCOUNTER — PRENATAL OFFICE VISIT (OUTPATIENT)
Dept: OBGYN | Facility: CLINIC | Age: 29
End: 2019-08-12
Payer: COMMERCIAL

## 2019-08-12 VITALS
OXYGEN SATURATION: 96 % | DIASTOLIC BLOOD PRESSURE: 59 MMHG | SYSTOLIC BLOOD PRESSURE: 102 MMHG | HEART RATE: 71 BPM | BODY MASS INDEX: 24.93 KG/M2 | WEIGHT: 158 LBS

## 2019-08-12 DIAGNOSIS — Z34.80 SUPERVISION OF OTHER NORMAL PREGNANCY, ANTEPARTUM: ICD-10-CM

## 2019-08-12 PROCEDURE — 99207 ZZC PRENATAL VISIT: CPT | Performed by: OBSTETRICS & GYNECOLOGY

## 2019-08-12 NOTE — PATIENT INSTRUCTIONS
If you have any questions regarding your visit, Please contact your care team.     [facility]: [phone]  [facility] CLINIC HOURS TELEPHONE NUMBER       Blair Cope M.D.        Kay-    Johanna De La Rosa-Medical Assistant       Monday-[place]  8:00a.m-4:45 p.m  Tuesday-[place]  9:00a.m-4:00 p.m  Wednesday-[place] 8:00a.m-4:45 p.m.  Thursday-[place]  8:00a.m-4:45 p.m.  Friday-[place]  8:00a.m-4:45 p.m. [facility]  [address] [place] [address]  [phone] ask for [facility]  [phone] Fax  Imaging [phone]    [facility] Labor and Delivery  [address] [place] [address] [place] [address]  [phone]    [facility]  [address] [place] [address]  [phone] ask [facility]  [phone] Fax  Imaging [phone]     Urgent Care locations:    [facility] Monday-Friday  5 pm - 9 pm  Saturday and Sunday   9 am - 5 pm  Monday-Friday   11 am - 9 pm  Saturday and Sunday   9 am - 5 pm   [phone]   If you need a medication refill, please contact your pharmacy. Please allow 3 business days for your refill to be completed.  As always, Thank you for trusting us with your healthcare needs!

## 2019-08-13 NOTE — PROGRESS NOTES
No signif signs, symptoms or concerns. Fetal growth satisfactory. Advice appropriate to gestational age reviewed including pertinent Checklist items. Discussed condition, tests and care plan including RBA. Problem list updated.   A/P:  Sonia was seen today for prenatal care.    Diagnoses and all orders for this visit:    Supervision of other normal pregnancy, antepartum        Total encounter time (physician together with patient) = 15min. Direct counseling, education and care coordination time (physician together with patient) = 10min.  Blair Cope MD

## 2019-08-19 ENCOUNTER — PRENATAL OFFICE VISIT (OUTPATIENT)
Dept: OBGYN | Facility: CLINIC | Age: 29
End: 2019-08-19
Payer: COMMERCIAL

## 2019-08-19 VITALS
BODY MASS INDEX: 25.09 KG/M2 | WEIGHT: 159 LBS | SYSTOLIC BLOOD PRESSURE: 108 MMHG | DIASTOLIC BLOOD PRESSURE: 63 MMHG | OXYGEN SATURATION: 98 % | HEART RATE: 71 BPM

## 2019-08-19 DIAGNOSIS — Z34.80 SUPERVISION OF OTHER NORMAL PREGNANCY, ANTEPARTUM: ICD-10-CM

## 2019-08-19 PROCEDURE — 99207 ZZC PRENATAL VISIT: CPT | Performed by: OBSTETRICS & GYNECOLOGY

## 2019-08-19 NOTE — PATIENT INSTRUCTIONS
If you have any questions regarding your visit, Please contact your care team.     [facility]: [phone]  [facility] CLINIC HOURS TELEPHONE NUMBER       Blair Cope M.D.        Kay-    Johanna De La Rosa-Medical Assistant       Monday-[place]  8:00a.m-4:45 p.m  Tuesday-[place]  9:00a.m-4:00 p.m  Wednesday-[place] 8:00a.m-4:45 p.m.  Thursday-[place]  8:00a.m-4:45 p.m.  Friday-[place]  8:00a.m-4:45 p.m. [facility]  [address] [place] [address]  [phone] ask for [facility]  [phone] Fax  Imaging [phone]    [facility] Labor and Delivery  [address] [place] [address] [place] [address]  [phone]    [facility]  [address] [place] [address]  [phone] ask [facility]  [phone] Fax  Imaging [phone]     Urgent Care locations:    [facility] Monday-Friday  5 pm - 9 pm  Saturday and Sunday   9 am - 5 pm  Monday-Friday   11 am - 9 pm  Saturday and Sunday   9 am - 5 pm   [phone]   If you need a medication refill, please contact your pharmacy. Please allow 3 business days for your refill to be completed.  As always, Thank you for trusting us with your healthcare needs!

## 2019-08-20 NOTE — PROGRESS NOTES
No signif signs, symptoms or concerns except occasional PSVT. Advice appropriate to gestational age reviewed including pertinent Checklist items. Discussed condition, tests and care plan including RBA. Problem list updated.   A/P:  Sonia was seen today for prenatal care.    Diagnoses and all orders for this visit:    Supervision of other normal pregnancy, antepartum        Total encounter time (physician together with patient) = 15min. Direct counseling, education and care coordination time (physician together with patient) = 10min.  Blair Cope MD

## 2019-08-26 ENCOUNTER — PRENATAL OFFICE VISIT (OUTPATIENT)
Dept: OBGYN | Facility: CLINIC | Age: 29
End: 2019-08-26
Payer: COMMERCIAL

## 2019-08-26 VITALS
DIASTOLIC BLOOD PRESSURE: 67 MMHG | BODY MASS INDEX: 24.67 KG/M2 | SYSTOLIC BLOOD PRESSURE: 105 MMHG | HEART RATE: 75 BPM | WEIGHT: 157.2 LBS | OXYGEN SATURATION: 97 % | HEIGHT: 67 IN | TEMPERATURE: 98.4 F

## 2019-08-26 DIAGNOSIS — Z34.80 SUPERVISION OF OTHER NORMAL PREGNANCY, ANTEPARTUM: Primary | ICD-10-CM

## 2019-08-26 PROCEDURE — 99207 ZZC PRENATAL VISIT: CPT | Performed by: NURSE PRACTITIONER

## 2019-08-26 ASSESSMENT — PAIN SCALES - GENERAL: PAINLEVEL: NO PAIN (0)

## 2019-08-26 ASSESSMENT — MIFFLIN-ST. JEOR: SCORE: 1461.71

## 2019-08-26 NOTE — PROGRESS NOTES
Patient presents for routine prenatal visit. Prenatal flowsheet reviewed and updated as needed.  Denies vaginal bleeding, loss of fluid, contractions or cramping. Denies HA, N/V, RUQ pain and vision changes.  Patient without complaint. Reviewed signs and symptoms of labor and when to proceed to L&D.  Advice as per Anticipatory Guidance/Checklist updated.  PE: See OB vitals    Questions asked and answered. Next OB visit in 1 week(s) with Dr. Cope.    Elizabeth GUARDADO CNP

## 2019-09-03 ENCOUNTER — PRENATAL OFFICE VISIT (OUTPATIENT)
Dept: OBGYN | Facility: CLINIC | Age: 29
End: 2019-09-03
Payer: COMMERCIAL

## 2019-09-03 VITALS
WEIGHT: 159 LBS | BODY MASS INDEX: 25.09 KG/M2 | SYSTOLIC BLOOD PRESSURE: 122 MMHG | DIASTOLIC BLOOD PRESSURE: 71 MMHG | HEART RATE: 74 BPM

## 2019-09-03 DIAGNOSIS — Z34.80 SUPERVISION OF OTHER NORMAL PREGNANCY, ANTEPARTUM: Primary | ICD-10-CM

## 2019-09-03 DIAGNOSIS — J02.9 SORE THROAT: ICD-10-CM

## 2019-09-03 LAB
DEPRECATED S PYO AG THROAT QL EIA: NORMAL
SPECIMEN SOURCE: NORMAL

## 2019-09-03 PROCEDURE — 99207 ZZC PRENATAL VISIT: CPT | Performed by: NURSE PRACTITIONER

## 2019-09-03 PROCEDURE — 87081 CULTURE SCREEN ONLY: CPT | Performed by: NURSE PRACTITIONER

## 2019-09-03 PROCEDURE — 87880 STREP A ASSAY W/OPTIC: CPT | Performed by: NURSE PRACTITIONER

## 2019-09-03 NOTE — PROGRESS NOTES
Patient presents for routine prenatal visit. Prenatal flowsheet reviewed and updated as needed.  Denies vaginal bleeding, loss of fluid, contractions or cramping. Denies HA, N/V, RUQ pain and vision changes.  Patient with complaint. Mild sore throat for few days, no other associated respiratory symptoms, no allergic rhinitis.   Advice as per Anticipatory Guidance/Checklist updated.  PE: See OB vitals  HEENT: WNL   RSS negative, await culture and treat if indicated. We did review symptom relief measures.    Questions asked and answered. Next OB visit in 1 week(s) with Dr. Deras.    Elizabeth GUARDADO CNP

## 2019-09-04 LAB
BACTERIA SPEC CULT: NORMAL
SPECIMEN SOURCE: NORMAL

## 2019-09-09 ENCOUNTER — PRENATAL OFFICE VISIT (OUTPATIENT)
Dept: OBGYN | Facility: CLINIC | Age: 29
End: 2019-09-09
Payer: COMMERCIAL

## 2019-09-09 VITALS
HEART RATE: 82 BPM | DIASTOLIC BLOOD PRESSURE: 81 MMHG | HEIGHT: 67 IN | OXYGEN SATURATION: 94 % | BODY MASS INDEX: 24.58 KG/M2 | SYSTOLIC BLOOD PRESSURE: 122 MMHG | TEMPERATURE: 97.8 F | WEIGHT: 156.6 LBS

## 2019-09-09 DIAGNOSIS — O48.0 POST-TERM PREGNANCY, 40-42 WEEKS OF GESTATION: Primary | ICD-10-CM

## 2019-09-09 PROCEDURE — 99207 ZZC PRENATAL VISIT: CPT | Performed by: NURSE PRACTITIONER

## 2019-09-09 ASSESSMENT — PAIN SCALES - GENERAL: PAINLEVEL: SEVERE PAIN (7)

## 2019-09-09 ASSESSMENT — MIFFLIN-ST. JEOR: SCORE: 1458.99

## 2019-09-09 NOTE — PROGRESS NOTES
Patient presents for routine prenatal visit. Prenatal flowsheet reviewed and updated as needed.  Denies vaginal bleeding, loss of fluid.  Intermittent contractions all day yesterday, woke up around 0330 this morning with increase intensity and frequency. In the last 3-4 hours, occurring Q 3-5 minutes and lasting 45-55 seconds.   Continues to feel fetal movement.  Advice as per Anticipatory Guidance/Checklist updated.  PE: See OB vitals    Questions asked and answered. Patient will proceed to L&D, her mother is with her at the appointment and can drive her. L&D notified.    Elizabeth GUARDADO CNP

## 2019-09-13 ENCOUNTER — TRANSFERRED RECORDS (OUTPATIENT)
Dept: HEALTH INFORMATION MANAGEMENT | Facility: CLINIC | Age: 29
End: 2019-09-13

## 2019-10-21 ENCOUNTER — PRENATAL OFFICE VISIT (OUTPATIENT)
Dept: OBGYN | Facility: CLINIC | Age: 29
End: 2019-10-21
Payer: COMMERCIAL

## 2019-10-21 VITALS
SYSTOLIC BLOOD PRESSURE: 110 MMHG | HEART RATE: 62 BPM | BODY MASS INDEX: 21.82 KG/M2 | DIASTOLIC BLOOD PRESSURE: 73 MMHG | WEIGHT: 139 LBS | HEIGHT: 67 IN

## 2019-10-21 PROBLEM — Z34.80 SUPERVISION OF OTHER NORMAL PREGNANCY, ANTEPARTUM: Status: RESOLVED | Noted: 2019-02-06 | Resolved: 2019-10-21

## 2019-10-21 PROCEDURE — 99207 ZZC POST PARTUM EXAM: CPT | Performed by: NURSE PRACTITIONER

## 2019-10-21 PROCEDURE — G0145 SCR C/V CYTO,THINLAYER,RESCR: HCPCS | Performed by: NURSE PRACTITIONER

## 2019-10-21 PROCEDURE — 87624 HPV HI-RISK TYP POOLED RSLT: CPT | Performed by: NURSE PRACTITIONER

## 2019-10-21 RX ORDER — DOCUSATE SODIUM 100 MG/1
100 CAPSULE, LIQUID FILLED ORAL
COMMUNITY
End: 2020-08-12

## 2019-10-21 ASSESSMENT — PATIENT HEALTH QUESTIONNAIRE - PHQ9
SUM OF ALL RESPONSES TO PHQ QUESTIONS 1-9: 2
SUM OF ALL RESPONSES TO PHQ QUESTIONS 1-9: 2
10. IF YOU CHECKED OFF ANY PROBLEMS, HOW DIFFICULT HAVE THESE PROBLEMS MADE IT FOR YOU TO DO YOUR WORK, TAKE CARE OF THINGS AT HOME, OR GET ALONG WITH OTHER PEOPLE: NOT DIFFICULT AT ALL

## 2019-10-21 ASSESSMENT — MIFFLIN-ST. JEOR: SCORE: 1379.16

## 2019-10-21 NOTE — PROGRESS NOTES
Sonia is here for a postpartum checkup.    She had a .  OB History    Para Term  AB Living   3 1 1 0 2 1   SAB TAB Ectopic Multiple Live Births   1 0 0 0 1      # Outcome Date GA Lbr Patrick/2nd Weight Sex Delivery Anes PTL Lv   3 Term 19 40w4d  3.43 kg (7 lb 9 oz) F  EPI N CARYN      Name: Mariza      Apgar1: 8  Apgar5: 9   2 SAB      SAB      1 AB 2007     TAB         Since delivery, she has been breast feeding.  She has not had a normal menses.  She has not had intercourse.  Patient screened for postpartum depression and complaints are NEGATIVE. Screening has also been completed for intimate partner violence. She would like to discuss contraception. Previously had a Estrellita IUD. Thinking she may do IUD again, but wants to discuss with .     O: This is a well appearing female in no acute distress. Answers questions and maintains eye contact appropriately. Vital signs noted.  RESPIRATORY: Clear to auscultation bilaterally.  CV: Regular rate and rhythm without murmur, gallop, rub  ABDOMEN: Soft, nontender, nondistended, normoactive bowel sounds. No hepatosplenomegaly. No guarding, rebounding, or rigidity.  Vulva: No external lesions, normal hair distribution, no adenopathy  BUS:  Normal, no masses noted  Vagina: Moist, pink, no abnormal discharge, well rugated, no lesions  Cervix: Pink, parous, midline. Without cervical motion tenderness.  Uterus: Normal size and shape, non-tender, mobile  Ovaries: No masses, non-tender, mobile    A/P  Routine Postpartum     - I discussed the new pap recommendations regarding screening.  Explained the rationale for increased intervals between paps.  Questions asked and answered.  She does agree to this regiment.   - Pap was performed   - Contraception:  Reviewed progesterone only and barrier options as she is nursing including mini pill, Depo Provera, IUD, Nexplanon, condoms. Will discuss with , but likely planning IUD. We discussed insertion,  removal, possible side effects, menstrual changes. Reviewed risk of uterine perforation with insertion and discussed possible need for surgical removal. Patient advised to take 600 mg Ibuprofen prior to insertion. For now, planning to continue to abstain until decision made and IUD placed if they move forward.    Answers for HPI/ROS submitted by the patient on 10/21/2019   If you checked off any problems, how difficult have these problems made it for you to do your work, take care of things at home, or get along with other people?: Not difficult at all  PHQ9 TOTAL SCORE: 2      Elizabeth GUARDADO CNP

## 2019-10-22 ASSESSMENT — PATIENT HEALTH QUESTIONNAIRE - PHQ9: SUM OF ALL RESPONSES TO PHQ QUESTIONS 1-9: 2

## 2019-10-23 LAB
COPATH REPORT: NORMAL
PAP: NORMAL

## 2019-10-24 LAB
FINAL DIAGNOSIS: NORMAL
HPV HR 12 DNA CVX QL NAA+PROBE: NEGATIVE
HPV16 DNA SPEC QL NAA+PROBE: NEGATIVE
HPV18 DNA SPEC QL NAA+PROBE: NEGATIVE
SPECIMEN DESCRIPTION: NORMAL
SPECIMEN SOURCE CVX/VAG CYTO: NORMAL

## 2019-12-27 ENCOUNTER — OFFICE VISIT (OUTPATIENT)
Dept: OBGYN | Facility: CLINIC | Age: 29
End: 2019-12-27
Payer: COMMERCIAL

## 2019-12-27 VITALS
DIASTOLIC BLOOD PRESSURE: 73 MMHG | HEART RATE: 79 BPM | HEIGHT: 67 IN | TEMPERATURE: 96.8 F | WEIGHT: 140.4 LBS | OXYGEN SATURATION: 96 % | BODY MASS INDEX: 22.03 KG/M2 | SYSTOLIC BLOOD PRESSURE: 121 MMHG

## 2019-12-27 DIAGNOSIS — Z30.430 ENCOUNTER FOR IUD INSERTION: Primary | ICD-10-CM

## 2019-12-27 LAB — HCG UR QL: NEGATIVE

## 2019-12-27 PROCEDURE — 58300 INSERT INTRAUTERINE DEVICE: CPT | Performed by: NURSE PRACTITIONER

## 2019-12-27 PROCEDURE — 81025 URINE PREGNANCY TEST: CPT | Performed by: NURSE PRACTITIONER

## 2019-12-27 ASSESSMENT — MIFFLIN-ST. JEOR: SCORE: 1376.54

## 2019-12-27 ASSESSMENT — PAIN SCALES - GENERAL: PAINLEVEL: NO PAIN (0)

## 2019-12-27 NOTE — PROGRESS NOTES
Sonia Candelario is a 29 year old  who presents today requesting placement of a Mirena IUD. No menses yet since delivery. Has been sexually active, but over 2 weeks ago.    The patient meets and is agreeable to the following conditions:  She is not interested in conception in the near future.   She currently is in a stable, monogamous relationship.   There is no previous history of pelvic inflammatory disease.   There is no previous history of ectopic pregnancy.   She is willing to check monthly for the IUD string.   There is no history of unresolved abnormal uterine bleeding.   There is no history of an unresolved abnormal PAP smear.   She has no history of Elton's disease or an allergy to copper (for Paragu[employer]).   She has had a PAP smear within the ACOG screening guideline.   She denies the possibility of pregnancy.     We discussed risks, benefits, and alternatives including but not limited to:   Possibility of pregnancy and ectopic pregnancy.  Possibility of pelvic inflammatory disease, particularly with new partners.  Risk of uterine perforation or IUD expulsion.  Possibility of difficult removal.  Spotting or heavy bleeding.  Cramping, pain or infection during or after insertion.    The patient was given patient information on the IUD and the patient education brochure from the .  The patient has given consent to proceed with placement of the IUD.  She wishes to proceed.  All questions answered.  UPT negative    PROCEDURE:    Type of IUD: Mirena    The patient has taken 600 mg of Ibuprofen prior to the procedure. She is placed in a dorsal lithotomy position and a pelvic exam is performed to determine the position of the uterus.  The cervix is identified and cleaned with betadine. A single tooth tenaculum is applied to the anterior lip of the cervix for stabilization. The uterus sounded to 7.0 cm. (Target sound depth is 6.5 cm to 8.5 cm.) The IUD insertion tube is prepared to  manufacturers recommendations and inserted into the uterus under sterile conditions in the usual fashion. The IUD string is then cut to 1.5 cm per patient request.    The patient tolerated this procedure without immediate complication.  The patient is to return or call immediately for any unexplained fever, abdominal or pelvic pain, excessive bleeding, possibility of pregnancy, foul-smelling discharge, sense that the IUD has been expelled.  All questions were answered.    Return to clinic in 1 month for IUD check  Lot#: [id number]  Exp: Jan2022  NDC#: 72720-984-22    Elizabeth GUARDADO CNP

## 2020-08-10 ENCOUNTER — MYC MEDICAL ADVICE (OUTPATIENT)
Dept: OBGYN | Facility: CLINIC | Age: 30
End: 2020-08-10

## 2020-08-12 ENCOUNTER — OFFICE VISIT (OUTPATIENT)
Dept: OBGYN | Facility: CLINIC | Age: 30
End: 2020-08-12
Payer: COMMERCIAL

## 2020-08-12 VITALS
WEIGHT: 129.4 LBS | OXYGEN SATURATION: 99 % | SYSTOLIC BLOOD PRESSURE: 115 MMHG | HEART RATE: 63 BPM | DIASTOLIC BLOOD PRESSURE: 72 MMHG | BODY MASS INDEX: 20.31 KG/M2 | TEMPERATURE: 97.1 F | HEIGHT: 67 IN

## 2020-08-12 DIAGNOSIS — Z30.432 ENCOUNTER FOR IUD REMOVAL: Primary | ICD-10-CM

## 2020-08-12 PROCEDURE — 58301 REMOVE INTRAUTERINE DEVICE: CPT | Performed by: OBSTETRICS & GYNECOLOGY

## 2020-08-12 ASSESSMENT — PAIN SCALES - GENERAL: PAINLEVEL: NO PAIN (0)

## 2020-08-12 ASSESSMENT — MIFFLIN-ST. JEOR: SCORE: 1326.64

## 2020-08-12 NOTE — PROGRESS NOTES
"SUBJECTIVE:  Sonia Candelario is a 29 year old female who presents to the clinic today for an IUD removal. They plan to start trying for another pregnancy. She is taking PNV. Currently only pumping and planning on weaning in the next month.     PMH/PSH/Meds/All were reviewed and updated at this visit.  ROS:4 point ROS including Respiratory, CV, GI and , other than that noted in the HPI,  is negative       OBJECTIVE:  Well appearing female in no acute distress. Answers questions and maintains eye contact appropriately.  Blood pressure 115/72, pulse 63, temperature 97.1  F (36.2  C), temperature source Tympanic, height 1.689 m (5' 6.5\"), weight 58.7 kg (129 lb 6.4 oz), SpO2 99 %, currently breastfeeding.  PELVIC:    External genitalia: normal without lesion. Normal BUS.  Vagina: normal mucosa and rugae, no discharge.  Cervix: normal without lesion.  Uterus: small, mobile, nontender.  Adnexa: non tender, without masses    ASSESSMENT:  IUD removal.    PLAN:  Removal procedure discussed with patient and she desires to proceed. Consent obtained.  Attempted to remove IUD with a ring forceps and IUD came down so the tip was just out of the cervical os and it would not move further. Patient felt a little lightheaded and we did not repeat the attempt. Dr. Mueller was in clinic today and after a brief period to relax, patient consented to repeat attempt by Dr. Mueller. The IUD was able to be removed intact.  Reportable signs and symptoms discussed. Report any fevers or excessive cramps.    Elizabeth GUARDADO CNP      "

## 2020-08-13 NOTE — PROGRESS NOTES
Patient was seen by Elizabeth today and I was asked to help with IUD removal.  The IUD was visible at the os at the time of my exam.  The IUD was grasped with the forceps and gentle constant pressure was used to remove the IUD intact.  The patient tolerated this well  The intact IUD was visualize by both myself and the patient and was disposed of appropriately.  Please see Elizabeth's note for further details.    Amirah Mueller MD

## 2020-11-25 ENCOUNTER — E-VISIT (OUTPATIENT)
Dept: URGENT CARE | Facility: CLINIC | Age: 30
End: 2020-11-25
Payer: COMMERCIAL

## 2020-11-25 DIAGNOSIS — Z20.822 SUSPECTED COVID-19 VIRUS INFECTION: Primary | ICD-10-CM

## 2020-11-25 DIAGNOSIS — Z20.822 SUSPECTED COVID-19 VIRUS INFECTION: ICD-10-CM

## 2020-11-25 PROCEDURE — U0003 INFECTIOUS AGENT DETECTION BY NUCLEIC ACID (DNA OR RNA); SEVERE ACUTE RESPIRATORY SYNDROME CORONAVIRUS 2 (SARS-COV-2) (CORONAVIRUS DISEASE [COVID-19]), AMPLIFIED PROBE TECHNIQUE, MAKING USE OF HIGH THROUGHPUT TECHNOLOGIES AS DESCRIBED BY CMS-2020-01-R: HCPCS | Performed by: FAMILY MEDICINE

## 2020-11-25 PROCEDURE — 99207 PR NO CHARGE LOS: CPT | Performed by: FAMILY MEDICINE

## 2020-11-25 NOTE — PATIENT INSTRUCTIONS
Dear [facility],    Your symptoms show that you could possible have coronavirus (COVID-19). This illness can cause fever, cough and trouble breathing. Many people get a mild case with symptoms such as congestion and headache and get better on their own.  Some people can get very sick.    Will I be tested for COVID-19?  We would like to test you for Covid-19 virus. I have placed orders for this test.   To schedule: go to your Possible Web home page and scroll down to the section that says  You have an appointment that needs to be scheduled  and click the large green button that says  Schedule Now  and follow the steps to find the next available openings.    If you are unable to complete these Possible Web scheduling steps, please call [phone] to schedule your testing.     When it's time for your COVID test:  Stay at least 6 feet away from others. (If someone will drive you to your test, stay in the backseat, as far away from the  as you can.)  Cover your mouth and nose with a mask, tissue or washcloth.  Go straight to the testing site. Don't make any stops on the way there or back.    Starting now:     Do not go to work. Follow your usual processes for taking time away from work.  o If you receive a negative COVID-19 test result and were NOT exposed to someone with a known positive COVID-19 test, you can return to work once you're free of fever for 24 hours without fever-reducing medication and your symptoms are improving or resolved.  o If you receive a positive COVID-19 test result, return to work should be at least 10 days from symptom onset (20 days if people have immune compromise) and people should be fever-free for 24 hours without medications, and the respiratory symptoms should be improved significantly before returning to work or school  o If you were exposed to someone who has tested positive for COVID-19, you can return to work 14 days after your last contact with the positive  "individual, provided you do not have symptoms at all during that time.    During this time, don't leave the house except for testing or medical care.  o Stay in your own room, even for meals. Use your own bathroom if you can.  o Stay away from others in your home. No hugging, kissing or shaking hands. No visitors.  o Don't go to work, school or anywhere else.    Clean \"high touch\" surfaces often (doorknobs, counters, handles, etc.). Use a household cleaning spray or wipes. You'll find a full list of  on the EPA website: www.epa.gov/pesticide-registration/list-n-disinfectants-use-against-sars-cov-2.    Cover your mouth and nose with a mask, tissue or washcloth to avoid spreading germs.    Wash your hands and face often. Use soap and water.    People in these groups are at risk for severe illness due to COVID-19:  o People 65 years and older  o People who live in a nursing home or long-term care facility  o People with chronic disease (lung, heart, cancer, diabetes, kidney, liver, immunologic)  o People who have a weakened immune system, including those who:  - Are in cancer treatment  - Take medicine that weakens the immune system, such as corticosteroids  - Had a bone marrow or organ transplant  - Have an immune deficiency  - Have poorly controlled HIV or AIDS  - Are obese (body mass index of 40 or higher)  - Smoke regularly      Caregivers should wear gloves while washing dishes, handling laundry and cleaning bedrooms and bathrooms.    Use caution when washing and drying laundry: Don't shake dirty laundry, and use the warmest water setting that you can.    For more tips, go to www.cdc.gov/coronavirus/2019-ncov/downloads/10Things.pdf.    Sign up for bright box. We know it's scary to hear that you might have COVID-19. We want to track your symptoms to make sure you're okay over the next 2 weeks. Please look for an email from bright box--this is a free, online program that we'll use to keep in touch. To " sign up, follow the link in the email you will receive. Learn more at [email or web address]    How can I take care of myself?    Get lots of rest. Drink extra fluids (unless a doctor has told you not to)    Take Tylenol (acetaminophen) for fever or pain. If you have liver or kidney problems, ask your family doctor if it's okay to take Tylenol.  Adults can take either:    650 mg (two 325 mg pills) every 4 to 6 hours, or     1,000 mg (two 500 mg pills) every 8 hours as needed.    Note: Don't take more than 3,000 mg in one day. Acetaminophen is found in many medicines (both prescribed and over-the-counter medicines). Read all labels to be sure you don't take too much.  For children, check the Tylenol bottle for the right dose. The dose is based on the child's age or weight.    If you have other health problems (like cancer, heart failure, an organ transplant or severe kidney disease): Call your specialty clinic if you don't feel better in the next 2 days.    Know when to call 911. Emergency warning signs include:  Trouble breathing or shortness of breath  Pain or pressure in the chest that doesn't go away  Feeling confused like you haven't felt before, or not being able to wake up  Bluish-colored lips or face    Where can I get more information?    [facility] - About COVID-19: [email or web address]  CDC - What to Do If You're Sick: www.cdc.gov/coronavirus/2019-ncov/about/steps-when-sick.html    Dear Sonia Candelario,    Your symptoms show that you may have coronavirus (COVID-19). This illness can cause fever, cough and trouble breathing. Many people get a mild case and get better on their own. Some people can get very sick.    Will I be tested for COVID-19?  We would like to test you for Covid-19 virus. I have placed orders for this test.     For all employees or close contacts (except [place] and [employer] - see below), go to your [employer] home page and scroll down to the  "section that says  You have an appointment that needs to be scheduled  and click the large green button that says  Schedule Now  and follow the steps to find the next available opening.     If you are unable to complete these steps or if you cannot find any available times, please call [phone] to schedule employee testing.       [place] employees or close contacts, please call [phone].   [place] [employer] employees or close contacts call [phone].    When it's time for your COVID test:  Stay at least 6 feet away from others. (If someone will drive you to your test, stay in the backseat, as far away from the  as you can.)  Cover your mouth and nose with a mask, tissue or washcloth.  Go straight to the testing site. Don't make any stops on the way there or back.    Starting now:     Do not go to work.   o If you receive a negative COVID-19 test result and were NOT exposed to someone with a known positive COVID-19 test, you can return to work once you're free of fever for 24 hours without fever-reducing medication and your symptoms are improving or resolved.  o If you receive a positive COVID-19 test result, you must be cleared by Employee Occupational Health and Safety to return to work.   o If you were exposed to someone who has tested positive for COVID-19, you can return to work 14 days after your last contact with the positive individual, provided you do not have symptoms at all during that time. In some cases, your manager may ask you to come back sooner than 14 days.     During this time, don't leave the house except for testing or medical care.  o Stay in your own room, even for meals. Use your own bathroom if you can.  o Stay away from others in your home. No hugging, kissing or shaking hands. No visitors.  o Don't go to work, school or anywhere else.    Clean \"high touch\" surfaces often (doorknobs, counters, handles, etc.). Use a household cleaning spray or wipes. You'll find a full " list of  on the EPA website: www.epa.gov/pesticide-registration/list-n-disinfectants-use-against-sars-cov-2.    Cover your mouth and nose with a mask, tissue or washcloth to avoid spreading germs.    Wash your hands and face often. Use soap and water.    People in these groups are at risk for severe illness due to COVID-19:  o People 65 years and older  o People who live in a nursing home or long-term care facility  o People with chronic disease (lung, heart, cancer, diabetes, kidney, liver, immunologic)  o People who have a weakened immune system, including those who:  - Are in cancer treatment  - Take medicine that weakens the immune system, such as corticosteroids  - Had a bone marrow or organ transplant  - Have an immune deficiency  - Have poorly controlled HIV or AIDS  - Are obese (body mass index of 40 or higher)  - Smoke regularly      Caregivers should wear gloves while washing dishes, handling laundry and cleaning bedrooms and bathrooms.    Use caution when washing and drying laundry: Don't shake dirty laundry, and use the warmest water setting that you can.    For more tips, go to www.cdc.gov/coronavirus/2019-ncov/downloads/10Things.pdf.    Sign up for [employer]. We know it's scary to hear that you might have COVID-19. We want to track your symptoms to make sure you're okay over the next 2 weeks. Please look for an email from [employer]--this is a free, online program that we'll use to keep in touch. To sign up, follow the link in the email you will receive. Learn more at [email or web address]    How can I take care of myself?    Get lots of rest. Drink extra fluids (unless a doctor has told you not to)    Take Tylenol (acetaminophen) for fever or pain. If you have liver or kidney problems, ask your family doctor if it's okay to take Tylenol.  Adults can take either:    650 mg (two 325 mg pills) every 4 to 6 hours, or     1,000 mg (two 500 mg pills) every 8 hours as  needed.    Note: Don't take more than 3,000 mg in one day. Acetaminophen is found in many medicines (both prescribed and over-the-counter medicines). Read all labels to be sure you don't take too much.  For children, check the Tylenol bottle for the right dose. The dose is based on the child's age or weight.    If you have other health problems (like cancer, heart failure, an organ transplant or severe kidney disease): Call your specialty clinic if you don't feel better in the next 2 days.    Know when to call 911. Emergency warning signs include:  Trouble breathing or shortness of breath  Pain or pressure in the chest that doesn't go away  Feeling confused like you haven't felt before, or not being able to wake up  Bluish-colored lips or face    Where can I get more information?     [facility] - About COVID-19: [email or web address]  CDC - What to Do If You're Sick: www.cdc.gov/coronavirus/2019-ncov/about/steps-when-sick.html

## 2020-11-28 LAB
SARS-COV-2 RNA SPEC QL NAA+PROBE: NOT DETECTED
SPECIMEN SOURCE: NORMAL

## 2020-12-14 ENCOUNTER — HEALTH MAINTENANCE LETTER (OUTPATIENT)
Age: 30
End: 2020-12-14

## 2020-12-21 ENCOUNTER — OFFICE VISIT (OUTPATIENT)
Dept: OBGYN | Facility: CLINIC | Age: 30
End: 2020-12-21
Payer: COMMERCIAL

## 2020-12-21 VITALS
DIASTOLIC BLOOD PRESSURE: 77 MMHG | TEMPERATURE: 97.6 F | HEIGHT: 67 IN | SYSTOLIC BLOOD PRESSURE: 116 MMHG | HEART RATE: 83 BPM | WEIGHT: 128 LBS | BODY MASS INDEX: 20.09 KG/M2 | OXYGEN SATURATION: 99 %

## 2020-12-21 DIAGNOSIS — N91.2 ABSENCE OF MENSTRUATION: Primary | ICD-10-CM

## 2020-12-21 DIAGNOSIS — Z36.89 CONFIRM FETAL CARDIAC ACTIVITY USING ULTRASOUND: ICD-10-CM

## 2020-12-21 DIAGNOSIS — O21.9 NAUSEA AND VOMITING DURING PREGNANCY: ICD-10-CM

## 2020-12-21 LAB — B-HCG SERPL-ACNC: ABNORMAL IU/L (ref 0–5)

## 2020-12-21 PROCEDURE — 36415 COLL VENOUS BLD VENIPUNCTURE: CPT | Performed by: NURSE PRACTITIONER

## 2020-12-21 PROCEDURE — 84702 CHORIONIC GONADOTROPIN TEST: CPT | Performed by: NURSE PRACTITIONER

## 2020-12-21 PROCEDURE — 99213 OFFICE O/P EST LOW 20 MIN: CPT | Performed by: NURSE PRACTITIONER

## 2020-12-21 RX ORDER — METOCLOPRAMIDE 10 MG/1
10 TABLET ORAL
Qty: 120 TABLET | Refills: 1 | Status: SHIPPED | OUTPATIENT
Start: 2020-12-21 | End: 2021-02-08

## 2020-12-21 RX ORDER — ONDANSETRON 4 MG/1
TABLET, ORALLY DISINTEGRATING ORAL
COMMUNITY
Start: 2019-02-05 | End: 2021-02-08

## 2020-12-21 ASSESSMENT — MIFFLIN-ST. JEOR: SCORE: 1315.29

## 2020-12-21 ASSESSMENT — PAIN SCALES - GENERAL: PAINLEVEL: NO PAIN (0)

## 2020-12-21 NOTE — PROGRESS NOTES
S: Pt is a 30 year old,  3 para 1 who presents today with absence of menses. LMP was 2020. Weeks gestation: 7 weeks. RAS: 2021  Contraception: IUD removed a few months ago  Complaints: Nausea and vomiting.  Prenatal vitamins: taking  Pertinent Past Obstetric and Gynecological Medical History:  x 1, SAB x 1 and TAB x 1  Patient past medical, surgical, family, and social history reviewed. Nausea and vomiting daily. Today having a tough time keeping fluids down, no symptoms of dehydration at this time. Has prescription for Zofran from her last pregnancy, trying to take half tab or less at a time to get through her day at work.   Unable to leave urine sample at this time and if she drinks too much water to leave a sample, afraid she may vomit.     O: General appearance: Well appearing female in no acute distress. Answers questions and maintains eye contact appropriately.  Constitutional: healthy, alert and no distress  Musculoskeletal: extremities normal- no gross deformities noted, gait normal and normal muscle tone  Skin: no suspicious lesions or rashes  Psychiatric: mentation appears normal and affect normal/bright    A/P: Missed Menses  [place] [place] in this group, prenatal visit schedule, routine ultrasounds and labs. Early ultrasound ordered to confirm dates and viability.  Diet and exercise recommendations and restrictions reviewed. Discussed avoidance of tobacco, ETOH, and street drugs. Signs and symptoms to monitor for and report discussed. After ultrasound confirms dating, schedule a new prenatal visit between 10-12 weeks.  We discussed her nausea and vomiting. Will send in prescription for Reglan to take QID PRN. We discussed symptoms of dehydration and when ED visit for IV fluids would be needed.   Check serum HCG to confirm pregnancy.   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    Elizabeth GUARDADO [facility]  [place]

## 2020-12-28 ENCOUNTER — ANCILLARY PROCEDURE (OUTPATIENT)
Dept: ULTRASOUND IMAGING | Facility: CLINIC | Age: 30
End: 2020-12-28
Attending: NURSE PRACTITIONER
Payer: COMMERCIAL

## 2020-12-28 ENCOUNTER — MYC MEDICAL ADVICE (OUTPATIENT)
Dept: OBGYN | Facility: CLINIC | Age: 30
End: 2020-12-28

## 2020-12-28 DIAGNOSIS — O21.9 NAUSEA AND VOMITING DURING PREGNANCY: Primary | ICD-10-CM

## 2020-12-28 PROCEDURE — 76801 OB US < 14 WKS SINGLE FETUS: CPT | Performed by: RADIOLOGY

## 2020-12-28 RX ORDER — ONDANSETRON 8 MG/1
8 TABLET, ORALLY DISINTEGRATING ORAL EVERY 8 HOURS PRN
Qty: 60 TABLET | Refills: 1 | Status: SHIPPED | OUTPATIENT
Start: 2020-12-28 | End: 2021-08-09

## 2020-12-28 NOTE — TELEPHONE ENCOUNTER
Pt had an appt with DEBBY Raza CNP, on 12/21, for confirmation of pregnancy.  Per OV plan:  We discussed her nausea and vomiting. Will send in prescription for Reglan to take QID PRN. We discussed symptoms of dehydration and when ED visit for IV fluids would be needed.

## 2020-12-28 NOTE — TELEPHONE ENCOUNTER
At patient's visit, had discussed rationale for Reglan over Zofran, but she was at the point of feeling she may need both to make it through her day. She had some leftover Zofran already and had been told to let me know if new prescription was needed. Elizabeth GUARDADO CNP

## 2021-01-11 ENCOUNTER — PRENATAL OFFICE VISIT (OUTPATIENT)
Dept: OBGYN | Facility: CLINIC | Age: 31
End: 2021-01-11
Payer: COMMERCIAL

## 2021-01-11 VITALS
SYSTOLIC BLOOD PRESSURE: 106 MMHG | HEIGHT: 67 IN | HEART RATE: 90 BPM | WEIGHT: 129.4 LBS | BODY MASS INDEX: 20.31 KG/M2 | DIASTOLIC BLOOD PRESSURE: 69 MMHG | OXYGEN SATURATION: 100 %

## 2021-01-11 DIAGNOSIS — Z34.80 SUPERVISION OF OTHER NORMAL PREGNANCY, ANTEPARTUM: ICD-10-CM

## 2021-01-11 LAB
ABO + RH BLD: NORMAL
ABO + RH BLD: NORMAL
ALBUMIN UR-MCNC: NEGATIVE MG/DL
APPEARANCE UR: ABNORMAL
BACTERIA #/AREA URNS HPF: ABNORMAL /HPF
BILIRUB UR QL STRIP: NEGATIVE
BLD GP AB SCN SERPL QL: NORMAL
BLOOD BANK CMNT PATIENT-IMP: NORMAL
CAOX CRY #/AREA URNS HPF: ABNORMAL /HPF
COLOR UR AUTO: YELLOW
ERYTHROCYTE [DISTWIDTH] IN BLOOD BY AUTOMATED COUNT: 12.6 % (ref 10–15)
GLUCOSE UR STRIP-MCNC: 150 MG/DL
HBV SURFACE AG SERPL QL IA: NONREACTIVE
HCT VFR BLD AUTO: 36.9 % (ref 35–47)
HGB BLD-MCNC: 12.7 G/DL (ref 11.7–15.7)
HGB UR QL STRIP: ABNORMAL
HIV 1+2 AB+HIV1 P24 AG SERPL QL IA: NONREACTIVE
KETONES UR STRIP-MCNC: NEGATIVE MG/DL
LEUKOCYTE ESTERASE UR QL STRIP: NEGATIVE
MCH RBC QN AUTO: 30.7 PG (ref 26.5–33)
MCHC RBC AUTO-ENTMCNC: 34.4 G/DL (ref 31.5–36.5)
MCV RBC AUTO: 89 FL (ref 78–100)
NITRATE UR QL: NEGATIVE
NON-SQ EPI CELLS #/AREA URNS LPF: ABNORMAL /LPF
PH UR STRIP: 5.5 PH (ref 5–7)
PLATELET # BLD AUTO: 215 10E9/L (ref 150–450)
RBC # BLD AUTO: 4.14 10E12/L (ref 3.8–5.2)
RBC #/AREA URNS AUTO: ABNORMAL /HPF
SOURCE: ABNORMAL
SP GR UR STRIP: 1.02 (ref 1–1.03)
SPECIMEN EXP DATE BLD: NORMAL
UROBILINOGEN UR STRIP-MCNC: NORMAL MG/DL (ref 0–2)
WBC # BLD AUTO: 11.1 10E9/L (ref 4–11)
WBC #/AREA URNS AUTO: ABNORMAL /HPF

## 2021-01-11 PROCEDURE — 81001 URINALYSIS AUTO W/SCOPE: CPT | Performed by: OBSTETRICS & GYNECOLOGY

## 2021-01-11 PROCEDURE — 87340 HEPATITIS B SURFACE AG IA: CPT | Performed by: OBSTETRICS & GYNECOLOGY

## 2021-01-11 PROCEDURE — 86901 BLOOD TYPING SEROLOGIC RH(D): CPT | Performed by: OBSTETRICS & GYNECOLOGY

## 2021-01-11 PROCEDURE — 86900 BLOOD TYPING SEROLOGIC ABO: CPT | Performed by: OBSTETRICS & GYNECOLOGY

## 2021-01-11 PROCEDURE — 85027 COMPLETE CBC AUTOMATED: CPT | Performed by: OBSTETRICS & GYNECOLOGY

## 2021-01-11 PROCEDURE — 36415 COLL VENOUS BLD VENIPUNCTURE: CPT | Performed by: OBSTETRICS & GYNECOLOGY

## 2021-01-11 PROCEDURE — 87389 HIV-1 AG W/HIV-1&-2 AB AG IA: CPT | Performed by: OBSTETRICS & GYNECOLOGY

## 2021-01-11 PROCEDURE — 86780 TREPONEMA PALLIDUM: CPT | Mod: 90 | Performed by: OBSTETRICS & GYNECOLOGY

## 2021-01-11 PROCEDURE — 99000 SPECIMEN HANDLING OFFICE-LAB: CPT | Performed by: OBSTETRICS & GYNECOLOGY

## 2021-01-11 PROCEDURE — 86762 RUBELLA ANTIBODY: CPT | Performed by: OBSTETRICS & GYNECOLOGY

## 2021-01-11 PROCEDURE — 86850 RBC ANTIBODY SCREEN: CPT | Performed by: OBSTETRICS & GYNECOLOGY

## 2021-01-11 PROCEDURE — 87086 URINE CULTURE/COLONY COUNT: CPT | Performed by: OBSTETRICS & GYNECOLOGY

## 2021-01-11 PROCEDURE — 99207 PR FIRST OB VISIT: CPT | Performed by: OBSTETRICS & GYNECOLOGY

## 2021-01-11 ASSESSMENT — MIFFLIN-ST. JEOR: SCORE: 1321.64

## 2021-01-11 ASSESSMENT — PATIENT HEALTH QUESTIONNAIRE - PHQ9: SUM OF ALL RESPONSES TO PHQ QUESTIONS 1-9: 2

## 2021-01-11 NOTE — PROGRESS NOTES
30 year old  at 10w5d presents for New OB appointment.  Estimated Date of Delivery: Aug 4, 2021 by ultrasound at 7w 5d which is consistent with LMP.     Hyperemesis.  Weight stable.  Trying to stay hydrated.   Using zofran, unisom.        Electronic chart, including labs and imaging reviewed.    Last PHQ-9 score on record=   PHQ-9 SCORE 10/21/2019   PHQ-9 Total Score MyChart 2 (Minimal depression)   PHQ-9 Total Score 2       Obstetric History  OB History    Para Term  AB Living   4 1 1 0 2 1   SAB TAB Ectopic Multiple Live Births   1 0 0 0 1      # Outcome Date GA Lbr Patrick/2nd Weight Sex Delivery Anes PTL Lv   4 Current            3 Term 19 40w4d  3.43 kg (7 lb 9 oz) F  EPI N CARYN      Name: Mariza      Apgar1: 8  Apgar5: 9   2 SAB      SAB      1 AB      TAB            Most Recent Immunizations   Administered Date(s) Administered     DTaP, Unspecified 2019     HEPA 2013     HPV 2008     HepB 1998     Hib (PRP-T) 1990     Historical DTP/aP 1993     Influenza (IIV3) PF 10/10/2012     Influenza Vaccine IM > 6 months Valent IIV4 10/16/2018     MMR 1993     Mantoux Tuberculin Skin Test 2013     Meningococcal (Menactra ) 2013     Meningococcal (Menveo ) 10/22/2018     Poliovirus, inactivated (IPV) 1993     TD (ADULT, 7+) 2004     TDAP Vaccine (Adacel) 2019     Typhoid IM 10/22/2018     Yellow Fever, Live (Stamaril) 10/22/2018        Patient Active Problem List   Diagnosis     CARDIOVASCULAR SCREENING; LDL GOAL LESS THAN 160     Supervision of other normal pregnancy, antepartum       Current Outpatient Medications   Medication     doxylamine (UNISOM) 25 MG TABS tablet     ondansetron (ZOFRAN-ODT) 4 MG ODT tab     Prenatal Multivit-Min-Fe-FA (PRENATAL VITAMINS PO)     Docusate Calcium (STOOL SOFTENER PO)     metoclopramide (REGLAN) 10 MG tablet     ondansetron (ZOFRAN-ODT) 8 MG ODT tab     Pyridoxine HCl  (VITAMIN B-6 PO)     No current facility-administered medications for this visit.        No Known Allergies    History  Past Medical History:   Diagnosis Date     Abnormal Pap smear of cervix 8/25/2008    LSIL     Cervical dysplasia 2010    LEEP     Eczema      Hip dysplasia      Past Surgical History:   Procedure Laterality Date     COLPOSCOPY CERVIX, BIOPSY CERVIX, ENDOCERVICAL CURETTAGE, COMBINED  2010    moderate dysplasia     HC COLP CERVIX/UPPER VAGINA W LOOP ELEC BX CERVIX  2010     HC INSERTION INTRAUTERINE DEVICE  2011, 2016    [employer], Estrellita     HC REMOVE INTRAUTERINE DEVICE  2016, 2018     HC REPAIR OF NASAL SEPTUM  2006       Social History     Socioeconomic History     Marital status:      Spouse name: Narendra     Number of children: 0     Years of education: Not on file     Highest education level: Not on file   Occupational History     Occupation: PA in electrophysiology     Comment: [employer]   Social Needs     Financial resource strain: Not on file     Food insecurity     Worry: Not on file     Inability: Not on file     Transportation needs     Medical: Not on file     Non-medical: Not on file   Tobacco Use     Smoking status: Never Smoker     Smokeless tobacco: Never Used   Substance and Sexual Activity     Alcohol use: Not Currently     Drug use: No     Sexual activity: Yes     Partners: Male   Lifestyle     Physical activity     Days per week: Not on file     Minutes per session: Not on file     Stress: Not on file   Relationships     Social connections     Talks on phone: Not on file     Gets together: Not on file     Attends Buddhism service: Not on file     Active member of club or organization: Not on file     Attends meetings of clubs or organizations: Not on file     Relationship status: Not on file     Intimate partner violence     Fear of current or ex partner: Not on file     Emotionally abused: Not on file     Physically abused: Not on file     Forced sexual activity: Not  "on file   Other Topics Concern     Parent/sibling w/ CABG, MI or angioplasty before 65F 55M? No   Social History Narrative           ROS - Please see HPI, otherwise 10pt ROS negative.      Physical Exam  Vitals: /69 (BP Location: Right arm, Cuff Size: Adult Regular)   Pulse 90   Ht 1.689 m (5' 6.5\")   Wt 58.7 kg (129 lb 6.4 oz)   LMP 2020 (Exact Date)   SpO2 100%   BMI 20.57 kg/m    BMI= Body mass index is 20.57 kg/m .  Gen: Alert and oriented times 3, no acute distress.  Well developed, well nourished, pleasant.    Neck: Supple, no masses.  No thyromegaly.  Chest:  Non labored.  Clear to auscultation bilaterally.    Heart: Regular, normal S1, S2.  No murmurs.   Abdomen: Soft, nontender, nondistended.  No palpable masses.    :  Deferred.  I did her exam at her recent IUD removal and that was normal.   Extremities:  Nontender, no edema.        Assessment and Plan:  30 year old  with IUP at 10w5d.        ICD-10-CM    1. Supervision of other normal pregnancy, antepartum  Z34.80 Hepatitis B surface antigen     Rubella Antibody IgG Quantitative     ABO/Rh type and screen     *UA reflex to Microscopic     CBC with platelets     Urine Culture Aerobic Bacterial     HIV Antigen Antibody Combo     UA with Microscopic (Johana Milian; [facility])     Treponema Abs w Reflex to RPR and Titer       Hyperemesis.  She is feeling a little better and weight is stable. If she does not continue to improve, consider IV fluids and zofran, MVI at the infusion center.  Would also need BMP.    History of LEEP - over a decade ago.  Normal exam and normal term delivery recently.  Elect to hold off on cervical length, but may include it at 20 weeks.  Discussed genetic screening options:  Likely will decline.    Ordered labs   Folder given, outlining physician coverage, exercise, weight gain, schedule of visits, routine and indicated ultrasounds, prenatal vitamins and childbirth education.    Body mass index is " 20.57 kg/m . - recommend 25-35 lbs (BMI 18.5-24.9)  Return in 4 weeks for routine OB care          Amirah Mueller MD FACOG

## 2021-01-11 NOTE — PATIENT INSTRUCTIONS
Treatment of Nausea and Vomiting in Pregnancy    Stop prenatal vitamin and start a folic acid supplement only until nausea improves (folic acid 400 micrograms by mouth daily)    Ginger capsules 250 mg by mouth daily     Consider acupressure wrist bands    Vitamin B6 (pyridoxine) 10-25 mg by mouth 3-4 times per day.  This may be taken in combination with doxylamine.  Doxylamine 25 mg by mouth every night before bed.  You may also take doxylamine 12.5 mg (half tab) in the am and in the afternoon as needed.      It is important to stay hydrated.  Please let us know if your symptoms worsen or do not improve with the treatment plan listed above.

## 2021-01-12 LAB
BACTERIA SPEC CULT: NORMAL
Lab: NORMAL
RUBV IGG SERPL IA-ACNC: 43 IU/ML
SPECIMEN SOURCE: NORMAL
T PALLIDUM AB SER QL: NONREACTIVE

## 2021-02-08 ENCOUNTER — TELEPHONE (OUTPATIENT)
Dept: OBGYN | Facility: CLINIC | Age: 31
End: 2021-02-08

## 2021-02-08 ENCOUNTER — PRENATAL OFFICE VISIT (OUTPATIENT)
Dept: OBGYN | Facility: CLINIC | Age: 31
End: 2021-02-08
Payer: COMMERCIAL

## 2021-02-08 VITALS
OXYGEN SATURATION: 96 % | TEMPERATURE: 97.9 F | DIASTOLIC BLOOD PRESSURE: 69 MMHG | WEIGHT: 129.8 LBS | HEART RATE: 71 BPM | BODY MASS INDEX: 20.37 KG/M2 | SYSTOLIC BLOOD PRESSURE: 116 MMHG | HEIGHT: 67 IN

## 2021-02-08 DIAGNOSIS — Z34.80 SUPERVISION OF OTHER NORMAL PREGNANCY, ANTEPARTUM: Primary | ICD-10-CM

## 2021-02-08 DIAGNOSIS — R82.90 ABNORMAL URINE FINDING: ICD-10-CM

## 2021-02-08 LAB
ALBUMIN UR-MCNC: NEGATIVE MG/DL
AMORPH CRY #/AREA URNS HPF: ABNORMAL /HPF
APPEARANCE UR: ABNORMAL
BILIRUB UR QL STRIP: NEGATIVE
CAOX CRY #/AREA URNS HPF: ABNORMAL /HPF
COLOR UR AUTO: YELLOW
GLUCOSE UR STRIP-MCNC: NEGATIVE MG/DL
HGB UR QL STRIP: NEGATIVE
KETONES UR STRIP-MCNC: NEGATIVE MG/DL
LEUKOCYTE ESTERASE UR QL STRIP: NEGATIVE
NITRATE UR QL: NEGATIVE
NON-SQ EPI CELLS #/AREA URNS LPF: ABNORMAL /LPF
PH UR STRIP: 7.5 PH (ref 5–7)
RBC #/AREA URNS AUTO: ABNORMAL /HPF
SOURCE: ABNORMAL
SP GR UR STRIP: 1.01 (ref 1–1.03)
UROBILINOGEN UR STRIP-ACNC: 0.2 EU/DL (ref 0.2–1)
WBC #/AREA URNS AUTO: ABNORMAL /HPF

## 2021-02-08 PROCEDURE — 81001 URINALYSIS AUTO W/SCOPE: CPT | Performed by: NURSE PRACTITIONER

## 2021-02-08 PROCEDURE — 99207 PR PRENATAL VISIT: CPT | Performed by: NURSE PRACTITIONER

## 2021-02-08 RX ORDER — POLYETHYLENE GLYCOL 3350 17 G/17G
1 POWDER, FOR SOLUTION ORAL DAILY
COMMUNITY
End: 2021-09-30

## 2021-02-08 ASSESSMENT — PAIN SCALES - GENERAL: PAINLEVEL: NO PAIN (0)

## 2021-02-08 ASSESSMENT — MIFFLIN-ST. JEOR: SCORE: 1323.46

## 2021-02-08 NOTE — TELEPHONE ENCOUNTER
Spoke with patient-had been having some colicky pain, but that has resolved, no hematuria. For now, will monitor and follow up if that pain recurs. Elizabeth GUARDADO CNP

## 2021-02-08 NOTE — PROGRESS NOTES
Patient presents for routine prenatal visit. Prenatal flowsheet reviewed and updated as needed.  Denies vaginal bleeding, loss of fluid, contractions or cramping.  Patient feeling improvement in nausea and vomiting. Still has symptoms in the morning and takes part of 1 Zofran tablet, not needing full dose. Appetite is back so she is eating much more, weight is same as last visit, but no loss. Will continue to monitor this.  UA at last visit was abnormal, has had some mild low back pain, so will recheck this today.  Screening ultrasound ordered-requested cervical length as well due to history of LEEP.   Has had COVID vaccination x 2.  Will likely pass on Quad screen.  Advice as per Anticipatory Guidance/Checklist updated.  PE: See OB vitals    Questions asked and answered. Next OB visit in 4 week(s) with Dr. Mueller.    Elizabeth GUARDADO CNP

## 2021-02-08 NOTE — TELEPHONE ENCOUNTER
Patient returning Elizabeth's phone call. Please call her back at [phone].  Davion Farah,  For 1st Floor Primary [facility]

## 2021-03-08 ENCOUNTER — PRENATAL OFFICE VISIT (OUTPATIENT)
Dept: OBGYN | Facility: CLINIC | Age: 31
End: 2021-03-08
Payer: COMMERCIAL

## 2021-03-08 VITALS
HEART RATE: 76 BPM | WEIGHT: 137.2 LBS | DIASTOLIC BLOOD PRESSURE: 53 MMHG | BODY MASS INDEX: 21.81 KG/M2 | SYSTOLIC BLOOD PRESSURE: 91 MMHG | OXYGEN SATURATION: 100 %

## 2021-03-08 DIAGNOSIS — Z34.80 SUPERVISION OF OTHER NORMAL PREGNANCY, ANTEPARTUM: Primary | ICD-10-CM

## 2021-03-08 PROCEDURE — 99207 PR PRENATAL VISIT: CPT | Performed by: OBSTETRICS & GYNECOLOGY

## 2021-03-08 NOTE — PROGRESS NOTES
Presents for routine  appointment.     No complaints.    No abnormal discharge , no leaking fluid , no contractions , no vaginal bleeding    ROS:   and GI  negative.     Please see Prenatal Vitals and Notes Flowsheet for objective data.    A/P:  30 year old  at 18w5d       ICD-10-CM    1. Supervision of other normal pregnancy, antepartum  Z34.80        Genetic screening - declined.   20 week ultrasound scheduled 3/18  Follow up in 4  week(s).      Amirah Mueller MD

## 2021-03-18 ENCOUNTER — ANCILLARY PROCEDURE (OUTPATIENT)
Dept: ULTRASOUND IMAGING | Facility: CLINIC | Age: 31
End: 2021-03-18
Attending: NURSE PRACTITIONER
Payer: COMMERCIAL

## 2021-03-18 DIAGNOSIS — Z34.80 SUPERVISION OF OTHER NORMAL PREGNANCY, ANTEPARTUM: ICD-10-CM

## 2021-03-18 PROCEDURE — 76805 OB US >/= 14 WKS SNGL FETUS: CPT | Performed by: RADIOLOGY

## 2021-04-05 ENCOUNTER — PRENATAL OFFICE VISIT (OUTPATIENT)
Dept: OBGYN | Facility: CLINIC | Age: 31
End: 2021-04-05
Payer: COMMERCIAL

## 2021-04-05 VITALS
SYSTOLIC BLOOD PRESSURE: 105 MMHG | HEART RATE: 75 BPM | DIASTOLIC BLOOD PRESSURE: 65 MMHG | OXYGEN SATURATION: 100 % | BODY MASS INDEX: 22.16 KG/M2 | WEIGHT: 139.4 LBS

## 2021-04-05 DIAGNOSIS — R73.09 ABNORMAL GLUCOSE TOLERANCE TEST: ICD-10-CM

## 2021-04-05 DIAGNOSIS — Z34.80 SUPERVISION OF OTHER NORMAL PREGNANCY, ANTEPARTUM: Primary | ICD-10-CM

## 2021-04-05 PROCEDURE — 99207 PR PRENATAL VISIT: CPT | Performed by: NURSE PRACTITIONER

## 2021-04-05 NOTE — PROGRESS NOTES
Patient presents for routine prenatal visit. Prenatal flowsheet reviewed and updated as needed.  Denies vaginal bleeding, loss of fluid, contractions or cramping.  Patient without complaint. Labs ordered for next prenatal visit. Discussed ultrasound result.  Nausea much better, only has symptoms on days she is up early for work, taking less than 1/2 tablet Zofran when needed. Adequate total weight gain.  Advice as per Anticipatory Guidance/Checklist updated.  PE: See OB vitals    Questions asked and answered. Next OB visit in 4 week(s) with Dr. Mueller.    Elizabeth GUARDADO CNP

## 2021-05-02 DIAGNOSIS — Z34.80 SUPERVISION OF OTHER NORMAL PREGNANCY, ANTEPARTUM: ICD-10-CM

## 2021-05-02 LAB — HGB BLD-MCNC: 10.7 G/DL (ref 11.7–15.7)

## 2021-05-02 PROCEDURE — 82950 GLUCOSE TEST: CPT | Performed by: NURSE PRACTITIONER

## 2021-05-02 PROCEDURE — 36415 COLL VENOUS BLD VENIPUNCTURE: CPT | Performed by: NURSE PRACTITIONER

## 2021-05-02 PROCEDURE — 85018 HEMOGLOBIN: CPT | Performed by: NURSE PRACTITIONER

## 2021-05-02 PROCEDURE — 86780 TREPONEMA PALLIDUM: CPT | Mod: 90 | Performed by: NURSE PRACTITIONER

## 2021-05-02 PROCEDURE — 99000 SPECIMEN HANDLING OFFICE-LAB: CPT | Performed by: NURSE PRACTITIONER

## 2021-05-03 ENCOUNTER — PRENATAL OFFICE VISIT (OUTPATIENT)
Dept: OBGYN | Facility: CLINIC | Age: 31
End: 2021-05-03
Payer: COMMERCIAL

## 2021-05-03 VITALS
WEIGHT: 142.7 LBS | DIASTOLIC BLOOD PRESSURE: 52 MMHG | BODY MASS INDEX: 22.69 KG/M2 | HEART RATE: 77 BPM | OXYGEN SATURATION: 100 % | SYSTOLIC BLOOD PRESSURE: 86 MMHG

## 2021-05-03 DIAGNOSIS — Z34.80 SUPERVISION OF OTHER NORMAL PREGNANCY, ANTEPARTUM: Primary | ICD-10-CM

## 2021-05-03 LAB
GLUCOSE 1H P 50 G GLC PO SERPL-MCNC: 134 MG/DL (ref 60–129)
T PALLIDUM AB SER QL: NONREACTIVE

## 2021-05-03 PROCEDURE — 99207 PR PRENATAL VISIT: CPT | Performed by: OBSTETRICS & GYNECOLOGY

## 2021-05-03 NOTE — PROGRESS NOTES
Presents for routine  appointment.     No complaints.    No abnormal discharge , no leaking fluid , no contractions , no vaginal bleeding    ROS:   and GI  negative.     Please see Prenatal Vitals and Notes Flowsheet for objective data.  Lab Results   Component Value Date    ABO A 2021    RH Pos 2021       A/P:  30 year old  at 26w5d       ICD-10-CM    1. Supervision of other normal pregnancy, antepartum  Z34.80        1 hr glucola yesterday, GCT pending.  She does have access to Viralicat.  She is Rh Positive   Hgb 10.7 - recommend iron.  Recheck hgb and ferritin next visit.   TDAP  next visit.    Discussed signs and symptoms of  labor  Follow up in 4  weeks.      Amirah Mueller MD

## 2021-05-14 DIAGNOSIS — R73.09 ABNORMAL GLUCOSE TOLERANCE TEST: ICD-10-CM

## 2021-05-14 LAB
GLUCOSE 1H P 100 G GLC PO SERPL-MCNC: 118 MG/DL (ref 60–179)
GLUCOSE 2H P 100 G GLC PO SERPL-MCNC: 102 MG/DL (ref 60–154)
GLUCOSE 3H P 100 G GLC PO SERPL-MCNC: 83 MG/DL (ref 60–139)
GLUCOSE BLDC GLUCOMTR-MCNC: 71 MG/DL (ref 70–99)
GLUCOSE P FAST SERPL-MCNC: 79 MG/DL (ref 60–94)

## 2021-05-14 PROCEDURE — 82952 GTT-ADDED SAMPLES: CPT | Performed by: NURSE PRACTITIONER

## 2021-05-14 PROCEDURE — 36415 COLL VENOUS BLD VENIPUNCTURE: CPT | Performed by: NURSE PRACTITIONER

## 2021-05-14 PROCEDURE — 82951 GLUCOSE TOLERANCE TEST (GTT): CPT | Performed by: NURSE PRACTITIONER

## 2021-05-14 PROCEDURE — 82962 GLUCOSE BLOOD TEST: CPT | Performed by: INTERNAL MEDICINE

## 2021-05-24 ENCOUNTER — PRENATAL OFFICE VISIT (OUTPATIENT)
Dept: OBGYN | Facility: CLINIC | Age: 31
End: 2021-05-24
Payer: COMMERCIAL

## 2021-05-24 VITALS
SYSTOLIC BLOOD PRESSURE: 112 MMHG | OXYGEN SATURATION: 99 % | BODY MASS INDEX: 22.61 KG/M2 | DIASTOLIC BLOOD PRESSURE: 72 MMHG | HEART RATE: 87 BPM | WEIGHT: 142.2 LBS

## 2021-05-24 DIAGNOSIS — Z34.80 SUPERVISION OF OTHER NORMAL PREGNANCY, ANTEPARTUM: Primary | ICD-10-CM

## 2021-05-24 DIAGNOSIS — Z23 NEED FOR TDAP VACCINATION: ICD-10-CM

## 2021-05-24 PROCEDURE — 90715 TDAP VACCINE 7 YRS/> IM: CPT | Performed by: NURSE PRACTITIONER

## 2021-05-24 PROCEDURE — 90471 IMMUNIZATION ADMIN: CPT | Performed by: NURSE PRACTITIONER

## 2021-05-24 PROCEDURE — 99207 PR PRENATAL VISIT: CPT | Performed by: NURSE PRACTITIONER

## 2021-05-24 ASSESSMENT — PAIN SCALES - GENERAL: PAINLEVEL: NO PAIN (0)

## 2021-05-24 NOTE — PROGRESS NOTES
Patient presents for routine prenatal visit. Prenatal flowsheet reviewed and updated as needed.  Denies vaginal bleeding, loss of fluid, contractions or cramping. Denies headache, nausea/vomiting, upper abdominal pain, vision changes, lower extremity swelling, chest pain or shortness of breath.  Patient without complaint. Tdap given.  Plan to recheck HGB and Ferritin on return to clinic-wants to get more consistent with taking supplement before rechecking.   Passed 3 hour GTT.  Advice as per Anticipatory Guidance/Checklist updated.  PE: See OB vitals    Questions asked and answered. Next OB visit in 2 week(s) with Dr. Mueller.    Elizabeth GUARDADO CNP

## 2021-05-24 NOTE — NURSING NOTE
Prior to immunization administration, verified patients identity using patient s name and date of birth. Please see Immunization Activity for additional information.     Screening Questionnaire for Adult Immunization    Are you sick today?   No   Do you have allergies to medications, food, a vaccine component or latex?   No   Have you ever had a serious reaction after receiving a vaccination?   No   Do you have a long-term health problem with heart, lung, kidney, or metabolic disease (e.g., diabetes), asthma, a blood disorder, no spleen, complement component deficiency, a cochlear implant, or a spinal fluid leak?  Are you on long-term aspirin therapy?   No   Do you have cancer, leukemia, HIV/AIDS, or any other immune system problem?   No   Do you have a parent, brother, or sister with an immune system problem?   No   In the past 3 months, have you taken medications that affect  your immune system, such as prednisone, other steroids, or anticancer drugs; drugs for the treatment of rheumatoid arthritis, Crohn s disease, or psoriasis; or have you had radiation treatments?   No   Have you had a seizure, or a brain or other nervous system problem?   No   During the past year, have you received a transfusion of blood or blood    products, or been given immune (gamma) globulin or antiviral drug?   No   For women: Are you pregnant or is there a chance you could become       pregnant during the next month?   Yes   Have you received any vaccinations in the past 4 weeks?   No     Immunization questionnaire was positive for at least one answer.  Notified Elizabeth Welch.        Per orders of Elizabeth Welch, injection of TDAP given by Koko Paniagua MA. Patient instructed to remain in clinic for 15 minutes afterwards, and to report any adverse reaction to me immediately.       Screening performed by Koko Paniagua MA on 5/24/2021 at 9:36 AM.

## 2021-06-01 ENCOUNTER — E-VISIT (OUTPATIENT)
Dept: URGENT CARE | Facility: URGENT CARE | Age: 31
End: 2021-06-01
Payer: COMMERCIAL

## 2021-06-01 ENCOUNTER — NURSE TRIAGE (OUTPATIENT)
Dept: NURSING | Facility: CLINIC | Age: 31
End: 2021-06-01

## 2021-06-01 DIAGNOSIS — J01.80 OTHER ACUTE SINUSITIS, RECURRENCE NOT SPECIFIED: Primary | ICD-10-CM

## 2021-06-01 PROCEDURE — 99421 OL DIG E/M SVC 5-10 MIN: CPT | Performed by: PHYSICIAN ASSISTANT

## 2021-06-01 NOTE — PATIENT INSTRUCTIONS
Dear Sonia Candelario    After reviewing your responses, I've been able to diagnose you with?sinusitis.?     Based on your responses and diagnosis, I have prescribed Amoxicillin to treat your symptoms. I have sent this to your pharmacy.?     I am aware that you had a negative covid test on day one, however, one negative test does not rule out covid, even in vaccinated individuals.  I have ordered another test for you.      It is also important to stay well hydrated, get lots of rest and take over-the-counter decongestants,?tylenol?or ibuprofen if you?are able to?take those medications per your primary care provider to help relieve discomfort.?     It is important that you take?all of?your prescribed medication even if your symptoms are improving after a few doses.? Taking?all of?your medicine helps prevent the symptoms from returning.?     If your symptoms worsen, you develop severe headache, vomiting, high fever (>102), or are not improving in 7 days, please contact your primary care provider for an appointment or visit any of our convenient Walk-in Care or Urgent Care Centers to be seen which can be found on our website?here.?     Thanks again for choosing?us?as your health care partner,?   ?  Alcira Lomeli PA-C?

## 2021-06-02 DIAGNOSIS — J01.80 OTHER ACUTE SINUSITIS, RECURRENCE NOT SPECIFIED: ICD-10-CM

## 2021-06-02 LAB
SARS-COV-2 RNA RESP QL NAA+PROBE: NORMAL
SPECIMEN SOURCE: NORMAL

## 2021-06-02 PROCEDURE — U0003 INFECTIOUS AGENT DETECTION BY NUCLEIC ACID (DNA OR RNA); SEVERE ACUTE RESPIRATORY SYNDROME CORONAVIRUS 2 (SARS-COV-2) (CORONAVIRUS DISEASE [COVID-19]), AMPLIFIED PROBE TECHNIQUE, MAKING USE OF HIGH THROUGHPUT TECHNOLOGIES AS DESCRIBED BY CMS-2020-01-R: HCPCS | Performed by: PHYSICIAN ASSISTANT

## 2021-06-02 PROCEDURE — U0005 INFEC AGEN DETEC AMPLI PROBE: HCPCS | Performed by: PHYSICIAN ASSISTANT

## 2021-06-02 NOTE — TELEPHONE ENCOUNTER
Sonia calls and says that she had an e-visit with Alcira Eugene Garcia, and was told that she has acute bacterial sinusitis. Sonia says that Alcira was supposed to order Amoxicillin for her but has not done that. Sonia says that she is 30 weeks pregnant and wants this medication ordered at [facility]: [phone]. Dr. Desai-NICK Robles-was paged to call this nurse, at: [phone], via page , at: [phone]. Dr. Desai called this nurse back and this nurse told the DrZachery About pt's E-visit and about the Amoxicillin not ordered. Dr. Desai then says that pt. Can have: Amoxicillin 1000mg.po BID for 7 days. RN then called the [facility] and spoke to Sofie-pharmacist. RN told Sofie pt's Amoxicillin order, as told to this nurse, per Dr. Desai. Sofie says that he will get this ready for pt. Now, before the pharmacy closes. RN called Sonia back and told her that the Amoxicillin is ordered and the pharmacist is getting it ready now. Sonia says that she is very glad and now had the pharmacist calling her and she had to go and get the medication. COVID 19 Nurse Triage Plan/Patient Instructions    Please be aware that novel coronavirus (COVID-19) may be circulating in the community. If you develop symptoms such as fever, cough, or SOB or if you have concerns about the presence of another infection including coronavirus (COVID-19), please contact your health care provider or visit [email or web address].     Disposition/Instructions    Home care recommended. Follow home care protocol based instructions.    Thank you for taking steps to prevent the spread of this virus.  o Limit your contact with others.  o Wear a simple mask to cover your cough.  o Wash your hands well and often.    Resources    [facility]: About COVID-19: [email or web address]    CDC: What to Do If You're Sick: www.cdc.gov/coronavirus/2019-ncov/about/steps-when-sick.html    CDC: Ending Home Isolation:  www.cdc.gov/coronavirus/2019-ncov/hcp/disposition-in-home-patients.html     CDC: Caring for Someone: www.cdc.gov/coronavirus/2019-ncov/if-you-are-sick/care-for-someone.html     [facility]: Interim Guidance for Hospital Discharge to Home: www.health.[facility].[place]/diseases/coronavirus/hcp/hospdischarge.pdf    [facility] clinical trials (COVID-19 research studies): [email or web address].[email or web address]/umn-clinical-trials     Below are the COVID-19 hotlines at the [place] Department of [employer] ([facility]). Interpreters are available.   o For health questions: Call 493-180-7868 or 1-232.469.9201 (7 a.m. to 7 p.m.)  o For questions about schools and childcare: Call 455-850-5603 or 1-131.679.2595 (7 a.m. to 7 p.m.)                   Reason for Disposition    [1] Follow-up call to recent contact AND [2] information only call, no triage required    Additional Information    Negative: [1] Caller is not with the adult (patient) AND [2] reporting urgent symptoms    Negative: Lab result questions    Negative: Medication questions    Negative: Caller can't be reached by phone    Negative: Caller has already spoken to PCP or another triager    Negative: RN needs further essential information from caller in order to complete triage    Negative: Requesting regular office appointment    Negative: [1] Caller requesting NON-URGENT health information AND [2] PCP's office is the best resource    Negative: Health Information question, no triage required and triager able to answer question    Negative: General information question, no triage required and triager able to answer question    Negative: Question about upcoming scheduled test, no triage required and triager able to answer question    Negative: [1] Caller is not with the adult (patient) AND [2] probable NON-URGENT symptoms    Protocols used: INFORMATION ONLY CALL - NO TRIAGE-A-

## 2021-06-03 LAB
LABORATORY COMMENT REPORT: NORMAL
SARS-COV-2 RNA RESP QL NAA+PROBE: NEGATIVE
SPECIMEN SOURCE: NORMAL

## 2021-06-07 ENCOUNTER — PRENATAL OFFICE VISIT (OUTPATIENT)
Dept: OBGYN | Facility: CLINIC | Age: 31
End: 2021-06-07
Payer: COMMERCIAL

## 2021-06-07 VITALS
WEIGHT: 144.6 LBS | BODY MASS INDEX: 22.99 KG/M2 | OXYGEN SATURATION: 97 % | SYSTOLIC BLOOD PRESSURE: 105 MMHG | HEART RATE: 87 BPM | DIASTOLIC BLOOD PRESSURE: 64 MMHG

## 2021-06-07 DIAGNOSIS — Z34.80 SUPERVISION OF OTHER NORMAL PREGNANCY, ANTEPARTUM: Primary | ICD-10-CM

## 2021-06-07 DIAGNOSIS — O99.013 MATERNAL IRON DEFICIENCY ANEMIA COMPLICATING PREGNANCY IN THIRD TRIMESTER: ICD-10-CM

## 2021-06-07 DIAGNOSIS — D50.9 MATERNAL IRON DEFICIENCY ANEMIA COMPLICATING PREGNANCY IN THIRD TRIMESTER: ICD-10-CM

## 2021-06-07 LAB — HGB BLD-MCNC: 11 G/DL (ref 11.7–15.7)

## 2021-06-07 PROCEDURE — 36415 COLL VENOUS BLD VENIPUNCTURE: CPT | Performed by: OBSTETRICS & GYNECOLOGY

## 2021-06-07 PROCEDURE — 85018 HEMOGLOBIN: CPT | Performed by: OBSTETRICS & GYNECOLOGY

## 2021-06-07 PROCEDURE — 99207 PR PRENATAL VISIT: CPT | Performed by: OBSTETRICS & GYNECOLOGY

## 2021-06-07 RX ORDER — AMOXICILLIN 500 MG/1
CAPSULE ORAL
COMMUNITY
Start: 2021-06-01 | End: 2021-07-12

## 2021-06-07 NOTE — PROGRESS NOTES
Patient presents for routine prenatal visit at 31w5d.  Patient without complaint.   PE: See OB vitals  Body mass index is 22.99 kg/m .    Doing well.  No concerns today.  No vaginal bleeding, loss of fluid, or contractions  Check HGB  Questions asked and answered.      Jax Deras MD FACOG

## 2021-06-25 ENCOUNTER — TELEPHONE (OUTPATIENT)
Dept: OBGYN | Facility: OTHER | Age: 31
End: 2021-06-25

## 2021-06-25 NOTE — TELEPHONE ENCOUNTER
Pt currently 34w2d, last seen 6/7/2021, next appt 6/28/2021.    Pt calling as 3 children in her daughters  have developed hand foot mouth this week and today she was notified her daughter is not feeling well and has a sore on her hand.    Pt aware to take precaution in not coming in contact with open sores/blisters and frequent hand hygiene. RN advised to avoid contact w/ daughter if illness is suspected.    RN routing to provider for additional advisement/recommendations for pt.    Patient verbalized understanding and agreed to plan.     Marylin Montgomery RN on 6/25/2021 at 4:36 PM

## 2021-06-25 NOTE — TELEPHONE ENCOUNTER
RN spoke with Dr. Agbeh who recommends to continue with hand hygiene and avoiding contact when able.    RN called pt and relayed advisement.    Patient verbalized understanding and agreed to plan.     Patient was given the opportunity to ask additional questions and have them answered. Patient had no further questions.     Marylin Montgomery RN on 6/25/2021 at 5:07 PM

## 2021-06-28 ENCOUNTER — PRENATAL OFFICE VISIT (OUTPATIENT)
Dept: OBGYN | Facility: CLINIC | Age: 31
End: 2021-06-28
Payer: COMMERCIAL

## 2021-06-28 VITALS
BODY MASS INDEX: 23.75 KG/M2 | DIASTOLIC BLOOD PRESSURE: 71 MMHG | SYSTOLIC BLOOD PRESSURE: 111 MMHG | HEART RATE: 94 BPM | WEIGHT: 149.4 LBS | OXYGEN SATURATION: 100 %

## 2021-06-28 DIAGNOSIS — Z34.80 SUPERVISION OF OTHER NORMAL PREGNANCY, ANTEPARTUM: Primary | ICD-10-CM

## 2021-06-28 PROCEDURE — 99207 PR PRENATAL VISIT: CPT | Performed by: OBSTETRICS & GYNECOLOGY

## 2021-06-28 NOTE — PROGRESS NOTES
Presents for routine  appointment.     No complaints.    No abnormal discharge , no leaking fluid , no contractions , no vaginal bleeding    ROS:   and GI  negative.     Please see Prenatal Vitals and Notes Flowsheet for objective data.  Hemoglobin   Date Value Ref Range Status   2021 11.0 (L) 11.7 - 15.7 g/dL Final   ]     A/P:  30 year old  at 34w5d       ICD-10-CM    1. Supervision of other normal pregnancy, antepartum  Z34.80        Reportable signs and symptoms discussed  Group B Strep at 36+ weeks  Follow up in 2 weeks.      Amirah Mueller MD

## 2021-07-12 ENCOUNTER — PRENATAL OFFICE VISIT (OUTPATIENT)
Dept: OBGYN | Facility: CLINIC | Age: 31
End: 2021-07-12
Payer: COMMERCIAL

## 2021-07-12 ENCOUNTER — ANCILLARY PROCEDURE (OUTPATIENT)
Dept: ULTRASOUND IMAGING | Facility: CLINIC | Age: 31
End: 2021-07-12
Attending: OBSTETRICS & GYNECOLOGY
Payer: COMMERCIAL

## 2021-07-12 VITALS
SYSTOLIC BLOOD PRESSURE: 93 MMHG | OXYGEN SATURATION: 100 % | WEIGHT: 150.5 LBS | DIASTOLIC BLOOD PRESSURE: 52 MMHG | BODY MASS INDEX: 23.93 KG/M2 | HEART RATE: 81 BPM

## 2021-07-12 DIAGNOSIS — O36.5990 POOR FETAL GROWTH AFFECTING MANAGEMENT OF MOTHER, ANTEPARTUM, SINGLE OR UNSPECIFIED FETUS: ICD-10-CM

## 2021-07-12 DIAGNOSIS — N89.8 VAGINAL IRRITATION: ICD-10-CM

## 2021-07-12 DIAGNOSIS — Z34.80 SUPERVISION OF OTHER NORMAL PREGNANCY, ANTEPARTUM: Primary | ICD-10-CM

## 2021-07-12 DIAGNOSIS — N89.8 VAGINAL DISCHARGE: ICD-10-CM

## 2021-07-12 LAB
CLUE CELLS: ABNORMAL
TRICHOMONAS, WET PREP: ABNORMAL
WBC'S/HIGH POWER FIELD, WET PREP: ABNORMAL
YEAST, WET PREP: ABNORMAL

## 2021-07-12 PROCEDURE — 87653 STREP B DNA AMP PROBE: CPT | Performed by: OBSTETRICS & GYNECOLOGY

## 2021-07-12 PROCEDURE — 76816 OB US FOLLOW-UP PER FETUS: CPT | Performed by: RADIOLOGY

## 2021-07-12 PROCEDURE — 87210 SMEAR WET MOUNT SALINE/INK: CPT | Performed by: OBSTETRICS & GYNECOLOGY

## 2021-07-12 PROCEDURE — 99207 PR PRENATAL VISIT: CPT | Performed by: OBSTETRICS & GYNECOLOGY

## 2021-07-12 NOTE — PROGRESS NOTES
Presents for routine  appointment.     No complaints aside from some labial irritation.  No discharge    No abnormal discharge, no leaking fluid, no contractions, no vaginal bleeding   ROS:   and GI  negative.     Please see Prenatal Vitals and Notes Flowsheet for objective data.    A/P:  30 year old  at 36w5d       ICD-10-CM    1. Supervision of other normal pregnancy, antepartum  Z34.80 Group B strep PCR   2. Vaginal discharge  N89.8    3. Vaginal irritation  N89.8 Wet prep - Clinic Collect   4. Poor fetal growth affecting management of mother, antepartum, single or unspecified fetus  O36.5990 US OB >14 Weeks Follow Up       Group B Strep collected today  Discussed labor and what to expect. Discussed when to go to the birth center.  Follow up in 1 week.      Amirah Mueller MD

## 2021-07-13 LAB
GP B STREP DNA SPEC QL NAA+PROBE: NEGATIVE
PATIENT PENICILLIN, AMOXICILLIN, CEPHALOSPORINS ALLERGY: NO

## 2021-07-19 ENCOUNTER — PRENATAL OFFICE VISIT (OUTPATIENT)
Dept: OBGYN | Facility: CLINIC | Age: 31
End: 2021-07-19
Payer: COMMERCIAL

## 2021-07-19 ENCOUNTER — MYC MEDICAL ADVICE (OUTPATIENT)
Dept: OBGYN | Facility: CLINIC | Age: 31
End: 2021-07-19

## 2021-07-19 ENCOUNTER — TELEPHONE (OUTPATIENT)
Dept: OBGYN | Facility: OTHER | Age: 31
End: 2021-07-19

## 2021-07-19 VITALS
WEIGHT: 150.3 LBS | SYSTOLIC BLOOD PRESSURE: 105 MMHG | DIASTOLIC BLOOD PRESSURE: 62 MMHG | HEART RATE: 66 BPM | OXYGEN SATURATION: 99 % | BODY MASS INDEX: 23.9 KG/M2

## 2021-07-19 DIAGNOSIS — Z34.80 SUPERVISION OF OTHER NORMAL PREGNANCY, ANTEPARTUM: Primary | ICD-10-CM

## 2021-07-19 PROCEDURE — 99207 PR PRENATAL VISIT: CPT | Performed by: OBSTETRICS & GYNECOLOGY

## 2021-07-19 NOTE — PROGRESS NOTES
Presents for routine  appointment.       No complaints.  Vulvar irritation a little better.  Using triamcinolone ointment at night.   No abnormal discharge , no leaking fluid , no contractions , no vaginal bleeding    ROS:   and GI  negative.     Please see Prenatal Vitals and Notes Flowsheet for objective data.    Lab Results   Component Value Date    GBS Negative 2019       A/P:  30 year old  at 36w5d       ICD-10-CM    1. Supervision of other normal pregnancy, antepartum  Z34.80        Labor precautions given   triamcinolone bid     reviewed.  It looks like there was a  error initially.  The date of imaging that was plugged into the epic prenatal dating tab was incorrect. They used the encounter date of 2020 instead of the actual date of the ultrasound, which was 2020.  This puts her at 36 weeks, 5 days today.  Her GBS would have been collected at 35 weeks 5 days.  Reviewed the prenatal chart: her GCT and other labs were done within the appropriate timeframe.  I attempted to call the patient after the appointments and left a message.  I also sent a [employer] message.  Plan to discuss this at her next visit, but I am happy to talk with her over the phone before then, if that is what she prefers.  Follow up in 1 week.      Amirah Mueller MD

## 2021-07-26 ENCOUNTER — PRENATAL OFFICE VISIT (OUTPATIENT)
Dept: OBGYN | Facility: CLINIC | Age: 31
End: 2021-07-26
Payer: COMMERCIAL

## 2021-07-26 VITALS
HEART RATE: 90 BPM | WEIGHT: 152.9 LBS | SYSTOLIC BLOOD PRESSURE: 104 MMHG | BODY MASS INDEX: 24.31 KG/M2 | DIASTOLIC BLOOD PRESSURE: 61 MMHG | OXYGEN SATURATION: 100 %

## 2021-07-26 DIAGNOSIS — Z34.80 SUPERVISION OF OTHER NORMAL PREGNANCY, ANTEPARTUM: Primary | ICD-10-CM

## 2021-07-26 PROCEDURE — 99207 PR PRENATAL VISIT: CPT | Performed by: OBSTETRICS & GYNECOLOGY

## 2021-07-26 NOTE — PROGRESS NOTES
Presents for routine  appointment.     No complaints.    No abnormal discharge , no leaking fluid , no contractions , no vaginal bleeding    ROS:   and GI  negative.     Please see Prenatal Vitals and Notes Flowsheet for objective data.    Lab Results   Component Value Date    GBS Negative 2019       A/P:  30 year old  at 37w5d       ICD-10-CM    1. Supervision of other normal pregnancy, antepartum  Z34.80        Labor precautions given   Follow up in 1 week.      Amirah Mueller MD

## 2021-08-02 ENCOUNTER — PRENATAL OFFICE VISIT (OUTPATIENT)
Dept: OBGYN | Facility: CLINIC | Age: 31
End: 2021-08-02
Payer: COMMERCIAL

## 2021-08-02 VITALS
WEIGHT: 153 LBS | OXYGEN SATURATION: 99 % | BODY MASS INDEX: 24.32 KG/M2 | HEART RATE: 88 BPM | DIASTOLIC BLOOD PRESSURE: 74 MMHG | SYSTOLIC BLOOD PRESSURE: 114 MMHG

## 2021-08-02 DIAGNOSIS — Z34.80 SUPERVISION OF OTHER NORMAL PREGNANCY, ANTEPARTUM: Primary | ICD-10-CM

## 2021-08-02 PROCEDURE — 99207 PR PRENATAL VISIT: CPT | Performed by: NURSE PRACTITIONER

## 2021-08-02 NOTE — PROGRESS NOTES
Patient presents for routine prenatal visit. Prenatal flowsheet reviewed and updated as needed.  Denies vaginal bleeding, loss of fluid, contractions or cramping. Denies headache, nausea/vomiting, upper abdominal pain, vision changes, lower extremity swelling, chest pain or shortness of breath.  Patient without complaint. Reviewed signs and symptoms of labor and when to proceed to L&D.  Advice as per Anticipatory Guidance/Checklist updated.  PE: See OB vitals    Questions asked and answered. Next OB visit in 1 week(s) with OB Physician.    Elizabeth GUARDADO CNP

## 2021-08-09 ENCOUNTER — PRENATAL OFFICE VISIT (OUTPATIENT)
Dept: OBGYN | Facility: CLINIC | Age: 31
End: 2021-08-09
Payer: COMMERCIAL

## 2021-08-09 VITALS
OXYGEN SATURATION: 97 % | DIASTOLIC BLOOD PRESSURE: 70 MMHG | TEMPERATURE: 97.8 F | SYSTOLIC BLOOD PRESSURE: 114 MMHG | HEIGHT: 67 IN | HEART RATE: 67 BPM | WEIGHT: 152.6 LBS | BODY MASS INDEX: 23.95 KG/M2

## 2021-08-09 DIAGNOSIS — Z34.80 SUPERVISION OF OTHER NORMAL PREGNANCY, ANTEPARTUM: Primary | ICD-10-CM

## 2021-08-09 PROCEDURE — 99207 PR PRENATAL VISIT: CPT | Performed by: NURSE PRACTITIONER

## 2021-08-09 ASSESSMENT — MIFFLIN-ST. JEOR: SCORE: 1426.88

## 2021-08-09 ASSESSMENT — PAIN SCALES - GENERAL: PAINLEVEL: NO PAIN (0)

## 2021-08-09 NOTE — PROGRESS NOTES
Patient presents for routine prenatal visit. Prenatal flowsheet reviewed and updated as needed.  Denies vaginal bleeding, loss of fluid, contractions or cramping. Denies headache, nausea/vomiting, upper abdominal pain, vision changes, lower extremity swelling, chest pain or shortness of breath.  Patient without complaint. Reviewed signs and symptoms of labor and when to proceed to L&D.    Would like to plan induction at 41 weeks-too early now to set up. Plan BPP and prenatal visit this Friday and can schedule induction at that time based on availability in L&D.    Advice as per Anticipatory Guidance/Checklist updated.  PE: See OB vitals    Questions asked and answered.     Elizabeth GUARDADO CNP

## 2021-08-13 ENCOUNTER — PRENATAL OFFICE VISIT (OUTPATIENT)
Dept: OBGYN | Facility: CLINIC | Age: 31
End: 2021-08-13
Payer: COMMERCIAL

## 2021-08-13 ENCOUNTER — ANCILLARY PROCEDURE (OUTPATIENT)
Dept: ULTRASOUND IMAGING | Facility: CLINIC | Age: 31
End: 2021-08-13
Attending: NURSE PRACTITIONER
Payer: COMMERCIAL

## 2021-08-13 VITALS
WEIGHT: 155 LBS | HEART RATE: 80 BPM | SYSTOLIC BLOOD PRESSURE: 107 MMHG | DIASTOLIC BLOOD PRESSURE: 69 MMHG | BODY MASS INDEX: 24.64 KG/M2 | OXYGEN SATURATION: 99 %

## 2021-08-13 DIAGNOSIS — Z34.80 SUPERVISION OF OTHER NORMAL PREGNANCY, ANTEPARTUM: ICD-10-CM

## 2021-08-13 DIAGNOSIS — Z34.80 SUPERVISION OF OTHER NORMAL PREGNANCY, ANTEPARTUM: Primary | ICD-10-CM

## 2021-08-13 PROCEDURE — 99207 PR PRENATAL VISIT: CPT | Performed by: OBSTETRICS & GYNECOLOGY

## 2021-08-13 PROCEDURE — 76819 FETAL BIOPHYS PROFIL W/O NST: CPT | Performed by: RADIOLOGY

## 2021-08-13 NOTE — PATIENT INSTRUCTIONS
If you have any questions regarding your visit, Please contact your care team.     [facility]: [phone]  Women [facility] CLINIC HOURS TELEPHONE NUMBER       Blair Cope M.D.    Marylin-JASON German-JASON De La Rosa-Medical Assistant    Jannette-  Bernie-     Monday-[place]  8:00a.m-4:45 p.m  Tuesday-[place]  9:00a.m-4:00 p.m  Wednesday-[place] 8:00a.m-4:45 p.m.  Thursday-[place]  8:00a.m-4:45 p.m.  Friday-[place]  8:00a.m-4:45 p.m. [facility]  [id number] [facility] [place] [id number] [phone] ask for [facility] [place] [facility]  [phone] Fax  Imaging [phone]    [facility]  [address]  [place], [place] [address] [phone]    [facility]  [address] [place]  [place] [address] [phone] ask [facility]  [phone] Fax  Imaging [phone]     Urgent Care locations:    [place]        [place] Monday-Friday  5 pm - 9 pm  Saturday and Sunday   9 am - 5 pm  Monday-Friday   11 am - 9 pm  Saturday and Sunday   9 am - 5 pm   [phone]   If you need a medication refill, please contact your pharmacy. Please allow 3 business days for your refill to be completed.  As always, Thank you for trusting us with your healthcare needs!

## 2021-08-13 NOTE — PROGRESS NOTES
No significant signs, symptoms or concerns except occas presyncope. Plan induction at 41 weeks. BPP 8/8 today.    Counseling included the following: Advice appropriate to gestational age reviewed including pertinent Checklist items. Discussed condition, tests and care plan including risks, benefits, and alternatives. Problem list updated.   20 minutes spent on the date of encounter doing chart review, history, examination, discussion with patient, and documentation, and further activities as noted, and review of appropriateness of decision-making for care.  A/P:  Sonia was seen today for prenatal care.    Diagnoses and all orders for this visit:    Supervision of other normal pregnancy, antepartum        Blair Cope MD

## 2021-08-18 ENCOUNTER — TRANSFERRED RECORDS (OUTPATIENT)
Dept: HEALTH INFORMATION MANAGEMENT | Facility: CLINIC | Age: 31
End: 2021-08-18

## 2021-08-25 ENCOUNTER — TELEPHONE (OUTPATIENT)
Dept: OBGYN | Facility: CLINIC | Age: 31
End: 2021-08-25

## 2021-08-25 NOTE — TELEPHONE ENCOUNTER
Unable to reach patient via phone.  Left message of providers note, and to call clinic back with any question/concerns at [phone].    AVANI WileyN RN

## 2021-08-25 NOTE — TELEPHONE ENCOUNTER
Last seeb 21 for prenatal care.  Delivered  19.    Did have epidural in labor. After delivery while in hospital did have headache but resolved upon discharge of hospital.  HA started again, became worse 5-6/10.     Denies hx preeclampsia.   Headache -located forehead and behind eyes.  Denies Visual changes, RUQ pain, nausea, Dizzy or SOB. Pregnancy related swelling is resolved.    States is drinking fluids, caffeine and using ibuprofen. Doesn't get complete relief when laying flat, denies severe headache pain when upright. Does admit to lack of sleep w/ as well as neck tension from breast feeding.      RN encouraged adding in tylenol and well as ice/heat and gentle stretching of the neck. Pt has massage scheduled in 2 days. ED precautions given for severe/worsening headache, or new preeclampsia symptoms. Pt and RN don't feel headache is necessarily epidural related, but could be a potential cause.    Routing to provider for any further advise not given, or if feels anesthesia consult needed for potential epidural/spinal headache.     Fariba Burkett, AVANIN RN       Detail Level: Detailed

## 2021-08-25 NOTE — TELEPHONE ENCOUNTER
[facility] Call Center    Phone Message    May a detailed message be left on voicemail: yes     Reason for Call: Pt has been having headaches since she delivered her baby.  She's requesting a call back from a nurse to discuss.  Thanks.    Action Taken: Message routed to:  [facility] p [phone]    Travel Screening: Not Applicable

## 2021-09-23 ENCOUNTER — OFFICE VISIT (OUTPATIENT)
Dept: FAMILY MEDICINE | Facility: CLINIC | Age: 31
End: 2021-09-23
Payer: COMMERCIAL

## 2021-09-23 VITALS
OXYGEN SATURATION: 98 % | HEIGHT: 66 IN | TEMPERATURE: 98 F | RESPIRATION RATE: 16 BRPM | DIASTOLIC BLOOD PRESSURE: 65 MMHG | WEIGHT: 137.4 LBS | HEART RATE: 56 BPM | SYSTOLIC BLOOD PRESSURE: 102 MMHG | BODY MASS INDEX: 22.08 KG/M2

## 2021-09-23 DIAGNOSIS — Z00.00 ROUTINE GENERAL MEDICAL EXAMINATION AT A HEALTH CARE FACILITY: ICD-10-CM

## 2021-09-23 DIAGNOSIS — L71.0 PERIORAL DERMATITIS: Primary | ICD-10-CM

## 2021-09-23 PROCEDURE — 99395 PREV VISIT EST AGE 18-39: CPT | Performed by: PHYSICIAN ASSISTANT

## 2021-09-23 ASSESSMENT — ENCOUNTER SYMPTOMS
NAUSEA: 0
FEVER: 0
HEADACHES: 0
ARTHRALGIAS: 0
EYE PAIN: 0
MYALGIAS: 0
BREAST MASS: 0
COUGH: 0
CHILLS: 0
NERVOUS/ANXIOUS: 1
FREQUENCY: 0
JOINT SWELLING: 0
HEMATOCHEZIA: 0
PARESTHESIAS: 0
CONSTIPATION: 0
HEMATURIA: 0
DIZZINESS: 0
HEARTBURN: 0
ABDOMINAL PAIN: 0
DYSURIA: 0
PALPITATIONS: 0
DIARRHEA: 0
SORE THROAT: 0
WEAKNESS: 0
SHORTNESS OF BREATH: 0

## 2021-09-23 ASSESSMENT — MIFFLIN-ST. JEOR: SCORE: 1350.99

## 2021-09-23 ASSESSMENT — PAIN SCALES - GENERAL: PAINLEVEL: NO PAIN (0)

## 2021-09-23 NOTE — PROGRESS NOTES
SUBJECTIVE:   CC: Sonia Candelario is an 30 year old woman who presents for preventive health visit.       Patient has been advised of split billing requirements and indicates understanding: Yes  Healthy Habits:     Getting at least 3 servings of Calcium per day:  Yes    Bi-annual eye exam:  NO    Dental care twice a year:  Yes    Sleep apnea or symptoms of sleep apnea:  None    Diet:  Other    Frequency of exercise:  2-3 days/week    Duration of exercise:  45-60 minutes    Taking medications regularly:  No    Medication side effects:  Not applicable    PHQ-2 Total Score: 2    Additional concerns today:  Yes    Patient is 4 weeks postpartum. She has GYN appointment in 1-2 weeks. Patient is currently breastfeeding     Concern:   Patient has a red papular rash around the mouth for 1 year.     Today's PHQ-2 Score:   PHQ-2 ( 1999 Pf[employer]) 9/23/2021   Q1: Little interest or pleasure in doing things 1   Q2: Feeling down, depressed or hopeless 1   PHQ-2 Score 2   Q1: Little interest or pleasure in doing things Several days   Q2: Feeling down, depressed or hopeless Several days   PHQ-2 Score 2       Abuse: Current or Past (Physical, Sexual or Emotional) - No  Do you feel safe in your environment? Yes    Have you ever done Advance Care Planning? (For example, a Health Directive, POLST, or a discussion with a medical provider or your loved ones about your wishes): No, advance care planning information given to patient to review.  Patient plans to discuss their wishes with loved ones or provider.      Social History     Tobacco Use     Smoking status: Never Smoker     Smokeless tobacco: Never Used   Substance Use Topics     Alcohol use: Not Currently     If you drink alcohol do you typically have >3 drinks per day or >7 drinks per week? No    Alcohol Use 9/23/2021   Prescreen: >3 drinks/day or >7 drinks/week? No   Prescreen: >3 drinks/day or >7 drinks/week? -       Reviewed orders with patient.  Reviewed health  maintenance and updated orders accordingly - Yes  Patient Active Problem List   Diagnosis     CARDIOVASCULAR SCREENING; LDL GOAL LESS THAN 160     Supervision of other normal pregnancy, antepartum     Past Surgical History:   Procedure Laterality Date     COLPOSCOPY CERVIX, BIOPSY CERVIX, ENDOCERVICAL CURETTAGE, COMBINED  2010    moderate dysplasia     HC COLP CERVIX/UPPER VAGINA W LOOP ELEC BX CERVIX  2010     HC INSERTION INTRAUTERINE DEVICE  2011, 2016    [employer], [place]ita     HC REMOVE INTRAUTERINE DEVICE  2016, 2018     HC REPAIR OF NASAL SEPTUM  2006       Social History     Tobacco Use     Smoking status: Never Smoker     Smokeless tobacco: Never Used   Substance Use Topics     Alcohol use: Not Currently     Family History   Problem Relation Age of Onset     Diabetes No family hx of      Cancer No family hx of      Thyroid Disease No family hx of      Lupus No family hx of      Thrombophilia No family hx of      C.A.D. No family hx of      Psoriasis No family hx of      Eczema No family hx of      Melanoma No family hx of          Current Outpatient Medications   Medication Sig Dispense Refill     Prenatal Multivit-Min-Fe-FA (PRENATAL VITAMINS PO)        levonorgestrel (MIRENA) 20 MCG/24HR IUD 1 each (20 mcg) by Intrauterine route once       No Known Allergies    Breast Cancer Screening:  Any new diagnosis of family breast, ovarian, or bowel cancer? No    FHS-7: No flowsheet data found.  click delete button to remove this line now  Patient under 40 years of age: Routine Mammogram Screening not recommended.   Pertinent mammograms are reviewed under the imaging tab.    History of abnormal Pap smear: NO - age 30-65 PAP every 5 years with negative HPV co-testing recommended  PAP / HPV Latest Ref Rng & Units 10/21/2019 5/13/2013 5/16/2012   PAP (Historical) - NIL NIL NIL   HPV16 NEG:Negative Negative - -   HPV18 NEG:Negative Negative - -   HRHPV NEG:Negative Negative - -     Reviewed and updated as needed this visit  by clinical staff  Tobacco  Allergies  Meds  Problems  Med Hx  Surg Hx  Fam Hx  Soc Hx          Reviewed and updated as needed this visit by Provider  Tobacco  Allergies  Meds  Problems  Med Hx  Surg Hx  Fam Hx             Review of Systems   Constitutional: Negative for chills and fever.   HENT: Negative for congestion, ear pain, hearing loss and sore throat.    Eyes: Negative for pain and visual disturbance.   Respiratory: Negative for cough and shortness of breath.    Cardiovascular: Negative for chest pain, palpitations and peripheral edema.   Gastrointestinal: Negative for abdominal pain, constipation, diarrhea, heartburn, hematochezia and nausea.   Breasts:  Negative for tenderness, breast mass and discharge.   Genitourinary: Negative for dysuria, frequency, genital sores, hematuria, pelvic pain, urgency, vaginal bleeding and vaginal discharge.   Musculoskeletal: Negative for arthralgias, joint swelling and myalgias.   Skin: Negative for rash.   Neurological: Negative for dizziness, weakness, headaches and paresthesias.   Psychiatric/Behavioral: Positive for mood changes. The patient is nervous/anxious.      CONSTITUTIONAL: NEGATIVE for fever, chills, change in weight  INTEGUMENTARU/SKIN: NEGATIVE for worrisome rashes, moles or lesions  EYES: NEGATIVE for vision changes or irritation  ENT: NEGATIVE for ear, mouth and throat problems  RESP: NEGATIVE for significant cough or SOB  BREAST: NEGATIVE for masses, tenderness or discharge  CV: NEGATIVE for chest pain, palpitations or peripheral edema  GI: NEGATIVE for nausea, abdominal pain, heartburn, or change in bowel habits  : NEGATIVE for unusual urinary or vaginal symptoms. Periods are regular.  MUSCULOSKELETAL: NEGATIVE for significant arthralgias or myalgia  NEURO: NEGATIVE for weakness, dizziness or paresthesias  PSYCHIATRIC: NEGATIVE for changes in mood or affect     OBJECTIVE:   /65 (BP Location: Left arm, Patient Position: Sitting,  "Cuff Size: Adult Regular)   Pulse 56   Temp 98  F (36.7  C) (Tympanic)   Resp 16   Ht 1.678 m (5' 6.06\")   Wt 62.3 kg (137 lb 6.4 oz)   LMP 11/02/2020 (Exact Date)   SpO2 98%   BMI 22.13 kg/m    Physical Exam  GENERAL: healthy, alert and no distress  EYES: Eyes grossly normal to inspection, PERRL and conjunctivae and sclerae normal  HENT: ear canals and TM's normal, nose and mouth without ulcers or lesions  NECK: no adenopathy, no asymmetry, masses, or scars and thyroid normal to palpation  RESP: lungs clear to auscultation - no rales, rhonchi or wheezes  BREAST: normal without masses, tenderness or nipple discharge and no palpable axillary masses or adenopathy  CV: regular rate and rhythm, normal S1 S2, no S3 or S4, no murmur, click or rub, no peripheral edema and peripheral pulses strong  ABDOMEN: soft, nontender, no hepatosplenomegaly, no masses and bowel sounds normal   (female): patient declined   MS: no gross musculoskeletal defects noted, no edema  SKIN: no suspicious lesions or rashes  NEURO: Normal strength and tone, mentation intact and speech normal  PSYCH: mentation appears normal, affect normal/bright    Diagnostic Test Results:  Labs reviewed in Epic     ASSESSMENT/PLAN:       ICD-10-CM    1. Perioral dermatitis  L71.0 Adult Dermatology Referral   2. Routine general medical examination at a health care facility  Z00.00 Lipid Profile (Chol, Trig, HDL, LDL calc)     Basic metabolic panel  (Ca, Cl, CO2, Creat, Gluc, K, Na, BUN)     TSH with free T4 reflex     Treatment for perioral dermatitis (topical Metronidazole)  will affect breast milk   Patient will finish breastfeeding before she initiates it    Patient has been advised of split billing requirements and indicates understanding: Yes  COUNSELING:  Reviewed preventive health counseling, as reflected in patient instructions       Regular exercise       Healthy diet/nutrition    Estimated body mass index is 22.13 kg/m  as calculated from the " "following:    Height as of this encounter: 1.678 m (5' 6.06\").    Weight as of this encounter: 62.3 kg (137 lb 6.4 oz).        She reports that she has never smoked. She has never used smokeless tobacco.      Counseling Resources:  ATP IV Guidelines  Pooled Cohorts Equation Calculator  Breast Cancer Risk Calculator  BRCA-Related Cancer Risk Assessment: FHS-7 Tool  FRAX Risk Assessment  ICSI Preventive Guidelines  Dietary Guidelines for Americans, 2010  USDA's MyPlate  ASA Prophylaxis  Lung CA Screening    Sandra Dodge PA-C  [facility]  "

## 2021-09-30 ENCOUNTER — PRENATAL OFFICE VISIT (OUTPATIENT)
Dept: OBGYN | Facility: CLINIC | Age: 31
End: 2021-09-30
Payer: COMMERCIAL

## 2021-09-30 VITALS
SYSTOLIC BLOOD PRESSURE: 105 MMHG | BODY MASS INDEX: 21.83 KG/M2 | TEMPERATURE: 97.3 F | DIASTOLIC BLOOD PRESSURE: 65 MMHG | OXYGEN SATURATION: 100 % | WEIGHT: 135.8 LBS | HEART RATE: 81 BPM | HEIGHT: 66 IN

## 2021-09-30 DIAGNOSIS — Z00.00 ROUTINE GENERAL MEDICAL EXAMINATION AT A HEALTH CARE FACILITY: ICD-10-CM

## 2021-09-30 DIAGNOSIS — Z30.430 ENCOUNTER FOR INSERTION OF INTRAUTERINE CONTRACEPTIVE DEVICE: ICD-10-CM

## 2021-09-30 DIAGNOSIS — Z23 NEED FOR PROPHYLACTIC VACCINATION AND INOCULATION AGAINST INFLUENZA: ICD-10-CM

## 2021-09-30 LAB
ANION GAP SERPL CALCULATED.3IONS-SCNC: 2 MMOL/L (ref 3–14)
BUN SERPL-MCNC: 18 MG/DL (ref 7–30)
CALCIUM SERPL-MCNC: 9.1 MG/DL (ref 8.5–10.1)
CHLORIDE BLD-SCNC: 108 MMOL/L (ref 94–109)
CHOLEST SERPL-MCNC: 150 MG/DL
CO2 SERPL-SCNC: 28 MMOL/L (ref 20–32)
CREAT SERPL-MCNC: 0.97 MG/DL (ref 0.52–1.04)
FASTING STATUS PATIENT QL REPORTED: YES
GFR SERPL CREATININE-BSD FRML MDRD: 78 ML/MIN/1.73M2
GLUCOSE BLD-MCNC: 90 MG/DL (ref 70–99)
HDLC SERPL-MCNC: 81 MG/DL
LDLC SERPL CALC-MCNC: 64 MG/DL
NONHDLC SERPL-MCNC: 69 MG/DL
POTASSIUM BLD-SCNC: 4.2 MMOL/L (ref 3.4–5.3)
SODIUM SERPL-SCNC: 138 MMOL/L (ref 133–144)
TRIGL SERPL-MCNC: 27 MG/DL
TSH SERPL DL<=0.005 MIU/L-ACNC: 0.69 MU/L (ref 0.4–4)

## 2021-09-30 PROCEDURE — 84443 ASSAY THYROID STIM HORMONE: CPT | Performed by: NURSE PRACTITIONER

## 2021-09-30 PROCEDURE — 80048 BASIC METABOLIC PNL TOTAL CA: CPT | Performed by: NURSE PRACTITIONER

## 2021-09-30 PROCEDURE — 99207 PR POST PARTUM EXAM: CPT | Performed by: NURSE PRACTITIONER

## 2021-09-30 PROCEDURE — 80061 LIPID PANEL: CPT | Performed by: NURSE PRACTITIONER

## 2021-09-30 PROCEDURE — 90471 IMMUNIZATION ADMIN: CPT | Performed by: NURSE PRACTITIONER

## 2021-09-30 PROCEDURE — 36415 COLL VENOUS BLD VENIPUNCTURE: CPT | Performed by: NURSE PRACTITIONER

## 2021-09-30 PROCEDURE — 90686 IIV4 VACC NO PRSV 0.5 ML IM: CPT | Performed by: NURSE PRACTITIONER

## 2021-09-30 PROCEDURE — 58300 INSERT INTRAUTERINE DEVICE: CPT | Performed by: NURSE PRACTITIONER

## 2021-09-30 ASSESSMENT — PATIENT HEALTH QUESTIONNAIRE - PHQ9
SUM OF ALL RESPONSES TO PHQ QUESTIONS 1-9: 3
SUM OF ALL RESPONSES TO PHQ QUESTIONS 1-9: 3
10. IF YOU CHECKED OFF ANY PROBLEMS, HOW DIFFICULT HAVE THESE PROBLEMS MADE IT FOR YOU TO DO YOUR WORK, TAKE CARE OF THINGS AT HOME, OR GET ALONG WITH OTHER PEOPLE: NOT DIFFICULT AT ALL

## 2021-09-30 ASSESSMENT — PAIN SCALES - GENERAL: PAINLEVEL: NO PAIN (0)

## 2021-09-30 ASSESSMENT — MIFFLIN-ST. JEOR: SCORE: 1346.7

## 2021-09-30 NOTE — PROGRESS NOTES
Sonia is here for a postpartum checkup.    She had a   OB History    Para Term  AB Living   4 2 2 0 2 2   SAB TAB Ectopic Multiple Live Births   1 0 0 0 2      # Outcome Date GA Lbr Patrick/2nd Weight Sex Delivery Anes PTL Lv   4 Term 21 41w0d 02:56 / 00:16 3.59 kg (7 lb 14.6 oz) M Vag-Spont EPI N CARYN      Name: DELVIN LANGE      Apgar1: 9  Apgar5: 9   3 Term 19 40w4d  3.43 kg (7 lb 9 oz) F  EPI N CARYN      Name: Mariza      Apgar1: 8  Apgar5: 9   2 SAB      SAB      1 AB 2007     TAB         Since delivery, she has been breast feeding.  She has not had a normal menses.  She has not had intercourse.  Patient screened for postpartum depression and complaints are NEGATIVE. Screening has also been completed for intimate partner violence. She would like to discuss IUD insertion.    O: This is a well appearing female in no acute distress. Answers questions and maintains eye contact appropriately. Vital signs noted.  RESPIRATORY: Clear to auscultation bilaterally.  CV: Regular rate and rhythm without murmur, gallop, rub  ABDOMEN: Soft, nontender, nondistended, normoactive bowel sounds. No hepatosplenomegaly. No guarding, rebounding, or rigidity.  Vulva: No external lesions, normal hair distribution, no adenopathy  BUS:  Normal, no masses noted  Vagina: Moist, pink, no abnormal discharge, well rugated, no lesions  Cervix: Pink, parous, midline. Without cervical motion tenderness.  Uterus: Normal size and shape, non-tender, mobile  Ovaries: No masses, non-tender, mobile    A/P  Routine Postpartum     - I discussed the new pap recommendations regarding screening.  Explained the rationale for increased intervals between paps.  Questions asked and answered.  She does agree to this regiment.   - Pap was not performed   - Contraception: IUD-see other note    Elizabeth Welch APRN CNP  Answers for HPI/ROS submitted by the patient on 2021  If you checked off any problems, how  difficult have these problems made it for you to do your work, take care of things at home, or get along with other people?: Not difficult at all  PHQ9 TOTAL SCORE: 3

## 2021-09-30 NOTE — PROGRESS NOTES
Sonia Candelario is a 30 year old  who presents today requesting placement of a Mirena IUD. Has not been sexually active since delivery. Previously used IUD.    The patient meets and is agreeable to the following conditions:  She is not interested in conception in the near future.   She currently is in a stable, monogamous relationship.   There is no previous history of pelvic inflammatory disease.   There is no previous history of ectopic pregnancy.   She is willing to check monthly for the IUD string.   There is no history of unresolved abnormal uterine bleeding.   There is no history of an unresolved abnormal PAP smear.   She has no history of Elton's disease or an allergy to copper (for Paraguard).   She has had a PAP smear within the ACOG screening guideline.   She denies the possibility of pregnancy.     We discussed risks, benefits, and alternatives including but not limited to:   Possibility of pregnancy and ectopic pregnancy.  Possibility of pelvic inflammatory disease, particularly with new partners.  Risk of uterine perforation or IUD expulsion.  Possibility of difficult removal.  Spotting or heavy bleeding.  Cramping, pain or infection during or after insertion.    The patient was given patient information on the IUD and the patient education brochure from the .  The patient has given consent to proceed with placement of the IUD.  She wishes to proceed.  All questions answered.    PROCEDURE:    Type of IUD: Mirena    The patient has taken Ibuprofen prior to the procedure. She is placed in a dorsal lithotomy position and a pelvic exam is performed to determine the position of the uterus.  The cervix is identified and cleaned with betadine. A single tooth tenaculum is applied to the anterior lip of the cervix for stabilization. The uterus sounded to 7.0 cm. (Target sound depth is 6.5 cm to 8.5 cm.) The IUD insertion tube is prepared to manufacturers recommendations and inserted  into the uterus under sterile conditions in the usual fashion. The IUD string is then cut to 2.0 cm-shorter per patient request.    The patient tolerated this procedure without immediate complication.  The patient is to return or call immediately for any unexplained fever, abdominal or pelvic pain, excessive bleeding, possibility of pregnancy, foul-smelling discharge, sense that the IUD has been expelled.  All questions were answered.    Return to clinic in 1 month for IUD check    Elizabeth GUARDADO CNP

## 2021-10-01 ASSESSMENT — PATIENT HEALTH QUESTIONNAIRE - PHQ9: SUM OF ALL RESPONSES TO PHQ QUESTIONS 1-9: 3

## 2021-10-07 ENCOUNTER — MYC MEDICAL ADVICE (OUTPATIENT)
Dept: OBGYN | Facility: CLINIC | Age: 31
End: 2021-10-07

## 2021-10-18 ENCOUNTER — TELEPHONE (OUTPATIENT)
Dept: FAMILY MEDICINE | Facility: CLINIC | Age: 31
End: 2021-10-18

## 2021-10-18 NOTE — TELEPHONE ENCOUNTER
Patient has signs of post-partum depression.  Referral to [facility] placed.    Electronically signed by:  Denise Kerr MD

## 2021-10-21 ENCOUNTER — TELEPHONE (OUTPATIENT)
Dept: BEHAVIORAL HEALTH | Facility: CLINIC | Age: 31
End: 2021-10-21

## 2021-10-21 NOTE — TELEPHONE ENCOUNTER
Reached out to pt to offer [facility] appt per the request of Denise Green MD .Left voicemail with behavioral intakes number for scheduling.

## 2021-10-26 NOTE — PROGRESS NOTES
[facility]: : Integrated Behavioral Health  October 27, 2021      Behavioral Health Clinician Progress Note    Patient Name: Sonia Candelario           Service Type:  Individual      Service Location:   MyChart / Email (patient not reached)     Session Start Time: 8:30am  Session End Time: 9:08am      Session Length: 38 - 52      Attendees: Client    Visit Activities (Refresh list every visit): [facility] and [facility] Only    Diagnostic Assessment Date: Will be completed by the fourth visit  Treatment Plan Review Date: Provider and patient will continue to build rapport and discuss tx goals in their next few sessions.   Telemedicine Visit: The patient's condition can be safely assessed and treated via synchronous audio and visual telemedicine encounter.      Reason for Telemedicine Visit: Patient has requested telehealth visit and COVID-19    Originating Site (Patient Location): Patient's home    Distant Site (Provider Location): Provider Remote Setting- Home Office    Consent:  The patient/guardian has verbally consented to: the potential risks and benefits of telemedicine (video visit) versus in person care; bill my insurance or make self-payment for services provided; and responsibility for payment of non-covered services.     Mode of Communication:  Video Conference via [employer]    As the provider I attest to compliance with applicable laws and regulations related to telemedicine.    See Flowsheets for today's PHQ-9 and WALLACE-7 results  Previous PHQ-9:   PHQ-9 SCORE 1/11/2021 9/30/2021 10/27/2021   PHQ-9 Total Score MyChart - 3 (Minimal depression) 8 (Mild depression)   PHQ-9 Total Score 2 3 8     Previous WALLACE-7:   WALLACE-7 SCORE 10/27/2021   Total Score 13 (moderate anxiety)   Total Score 13       CHESTER LEVEL:  CHESTER Score (Last Two) 12/5/2011   CHESTER Raw Score 52   Activation Score 100   CHESTER Level 4       DATA  Extended Session (60+ minutes): No  Interactive Complexity:  No  Crisis: No  [facility] Patient: No    Treatment Objective(s) Addressed in This Session:  Target Behavior(s): Postpartum anxiety    Anxiety: will experience a reduction in anxiety and will develop more effective coping skills to manage anxiety symptoms  Adjustment Difficulties: will develop coping/problem-solving skills to facilitate more adaptive adjustment    Current Stressors / Issues:    Patient arrived in low spirits and was tearful throughout her individual session.  Patient recently had her son and second child 2 months ago and has a 2-year-old daughter at home with her .  Patient reports that her son is having difficulties with food allergies which has made it very difficult for her to care for him due to him being inconsolable at times.  Patient states that she feels a sense of guilt for not knowing it was because of her eating that was causing the problems.  She is now at dairy and soy free.  Patient expresses frustration that she does not feel she has many options to eat with these restrictions but acknowledges her son's health being top priority.  Due to her son's food allergies this is also impacting his ability to sleep throughout the night.  Patient states that he does not sleep longer than 30 to 45 minutes at a time which has drastically impacted her and her  sleep.  Patient reports noticing an increase in irritability and feeling on edge, increase in worries and being able to control her worries and feeling exhausted.  Patient reports that with Covid it has been a struggle with being able to get out and socialize with others which has been very difficult for her since she enjoys being social.  Provided psychoeducation on anxiety and stress management and discussed ways she can relieve stress and increase predictability and consistency with her routine.  Patient reports that she also struggles asking for help but acknowledges this importance and plans on speaking with her  on how  they can both take breaks throughout the week.  Patient is engaging in exercise on a weekly basis and finds this helpful for her mood.  Patient denied suicidal thoughts or thoughts of self-harm to her others.  Scheduled follow-up appointment on 11/3/2021.    Progress on Treatment Objective(s) / Homework:  New Objective established this session - PREPARATION (Decided to change - considering how); Intervened by negotiating a change plan and determining options / strategies for behavior change, identifying triggers, exploring social supports, and working towards setting a date to begin behavior change    Motivational Interviewing    MI Intervention: Expressed Empathy/Understanding, Permission to raise concern or advise, Open-ended questions, Reflections: simple and complex and Change talk (evoked)     Change Talk Expressed by the Patient: Desire to change Need to change    Provider Response to Change Talk: E - Evoked more info from patient about behavior change and S - Summarized patient's change talk statements    Also provided psychoeducation about behavioral health condition, symptoms, and treatment options    Care Plan review completed: Yes    Medication Review:  No current psychiatric medications prescribed    Medication Compliance:  NA    Changes in Health Issues:   None reported    Chemical Use Review:   Substance Use: Chemical use reviewed, no active concerns identified      Tobacco Use: No current tobacco use.      Assessment: Current Emotional / Mental Status (status of significant symptoms):  Risk status (Self / Other harm or suicidal ideation)  Patient denies a history of suicidal ideation, suicide attempts, self-injurious behavior, homicidal ideation, homicidal behavior and and other safety concerns  Patient denies current fears or concerns for personal safety.  Patient denies current or recent suicidal ideation or behaviors.  Patient denies current or recent homicidal ideation or behaviors.  Patient  denies current or recent self injurious behavior or ideation.  Patient denies other safety concerns.  A safety and risk management plan has not been developed at this time, however patient was encouraged to call [place] [phone] should there be a change in any of these risk factors.    Appearance:   Appropriate   Eye Contact:   Good   Psychomotor Behavior: Normal   Attitude:   Cooperative   Orientation:   All  Speech   Rate / Production: Emotional   Volume:  Normal   Mood:    Sad   Affect:    Tearful  Thought Content:  Clear   Thought Form:  Coherent  Logical  Circumstantial  Insight:    Good     Diagnoses:  1. Adjustment disorder with mixed anxiety and depressed mood        Collateral Reports Completed:  Routed note to PCP    Plan: (Homework, other):  Patient was given information about behavioral services and encouraged to schedule a follow up appointment with the clinic [facility] in 1 week.  She was also given information about mental health symptoms and treatment options .  CD Recommendations: No indications of CD issues.    NAZIA Rivera      ______________________________________________________________________    Integrated Primary Care Behavioral Health Treatment Plan    Patient's Name: Sonia Candelario  YOB: 1990    Date: 10/27/21    DSM-V Diagnoses: Adjustment disorder with mixed anxiety and depressed mood  Psychosocial / Contextual Factors: Recently had a baby, has a 2-year-old and a 2-month-old, COVID-19, lack of sleep, infant son has digestive issues  WHODAS: [id number]    Referral / Collaboration:  Referral to another professional/service is not indicated at this time..    Anticipated number of session or this episode of care: 10+    Provider and patient will continue to build rapport and discuss tx goals in their next few sessions.     Patient has reviewed and agreed to the above plan.      NAZIA Rivera  October 27, 2021    Answers for HPI/ROS submitted by the patient on  10/27/2021  If you checked off any problems, how difficult have these problems made it for you to do your work, take care of things at home, or get along with other people?: Not difficult at all  PHQ9 TOTAL SCORE: 8  WALLACE 7 TOTAL SCORE: 13

## 2021-10-27 ENCOUNTER — VIRTUAL VISIT (OUTPATIENT)
Dept: BEHAVIORAL HEALTH | Facility: CLINIC | Age: 31
End: 2021-10-27
Payer: COMMERCIAL

## 2021-10-27 DIAGNOSIS — F43.23 ADJUSTMENT DISORDER WITH MIXED ANXIETY AND DEPRESSED MOOD: Primary | ICD-10-CM

## 2021-10-27 PROCEDURE — 90834 PSYTX W PT 45 MINUTES: CPT | Mod: 95 | Performed by: SOCIAL WORKER

## 2021-10-27 ASSESSMENT — COLUMBIA-SUICIDE SEVERITY RATING SCALE - C-SSRS
TOTAL  NUMBER OF ABORTED OR SELF INTERRUPTED ATTEMPTS PAST 3 MONTHS: NO
6. HAVE YOU EVER DONE ANYTHING, STARTED TO DO ANYTHING, OR PREPARED TO DO ANYTHING TO END YOUR LIFE?: NO
1. IN THE PAST MONTH, HAVE YOU WISHED YOU WERE DEAD OR WISHED YOU COULD GO TO SLEEP AND NOT WAKE UP?: NO
2. HAVE YOU ACTUALLY HAD ANY THOUGHTS OF KILLING YOURSELF LIFETIME?: NO
TOTAL  NUMBER OF INTERRUPTED ATTEMPTS PAST 3 MONTHS: NO
1. IN THE PAST MONTH, HAVE YOU WISHED YOU WERE DEAD OR WISHED YOU COULD GO TO SLEEP AND NOT WAKE UP?: NO
TOTAL  NUMBER OF INTERRUPTED ATTEMPTS LIFETIME: NO
3. HAVE YOU BEEN THINKING ABOUT HOW YOU MIGHT KILL YOURSELF?: NO
ATTEMPT LIFETIME: NO
6. HAVE YOU EVER DONE ANYTHING, STARTED TO DO ANYTHING, OR PREPARED TO DO ANYTHING TO END YOUR LIFE?: NO
5. HAVE YOU STARTED TO WORK OUT OR WORKED OUT THE DETAILS OF HOW TO KILL YOURSELF? DO YOU INTEND TO CARRY OUT THIS PLAN?: NO
ATTEMPT PAST THREE MONTHS: NO
2. HAVE YOU ACTUALLY HAD ANY THOUGHTS OF KILLING YOURSELF?: NO
4. HAVE YOU HAD THESE THOUGHTS AND HAD SOME INTENTION OF ACTING ON THEM?: NO
5. HAVE YOU STARTED TO WORK OUT OR WORKED OUT THE DETAILS OF HOW TO KILL YOURSELF? DO YOU INTEND TO CARRY OUT THIS PLAN?: NO
TOTAL  NUMBER OF ABORTED OR SELF INTERRUPTED ATTEMPTS PAST LIFETIME: NO
4. HAVE YOU HAD THESE THOUGHTS AND HAD SOME INTENTION OF ACTING ON THEM?: NO

## 2021-10-27 ASSESSMENT — ANXIETY QUESTIONNAIRES
5. BEING SO RESTLESS THAT IT IS HARD TO SIT STILL: SEVERAL DAYS
2. NOT BEING ABLE TO STOP OR CONTROL WORRYING: SEVERAL DAYS
8. IF YOU CHECKED OFF ANY PROBLEMS, HOW DIFFICULT HAVE THESE MADE IT FOR YOU TO DO YOUR WORK, TAKE CARE OF THINGS AT HOME, OR GET ALONG WITH OTHER PEOPLE?: SOMEWHAT DIFFICULT
7. FEELING AFRAID AS IF SOMETHING AWFUL MIGHT HAPPEN: SEVERAL DAYS
GAD7 TOTAL SCORE: 13
7. FEELING AFRAID AS IF SOMETHING AWFUL MIGHT HAPPEN: SEVERAL DAYS
1. FEELING NERVOUS, ANXIOUS, OR ON EDGE: SEVERAL DAYS
6. BECOMING EASILY ANNOYED OR IRRITABLE: NEARLY EVERY DAY
4. TROUBLE RELAXING: NEARLY EVERY DAY
GAD7 TOTAL SCORE: 13
GAD7 TOTAL SCORE: 13
3. WORRYING TOO MUCH ABOUT DIFFERENT THINGS: NEARLY EVERY DAY

## 2021-10-27 ASSESSMENT — PATIENT HEALTH QUESTIONNAIRE - PHQ9
SUM OF ALL RESPONSES TO PHQ QUESTIONS 1-9: 8
SUM OF ALL RESPONSES TO PHQ QUESTIONS 1-9: 8
10. IF YOU CHECKED OFF ANY PROBLEMS, HOW DIFFICULT HAVE THESE PROBLEMS MADE IT FOR YOU TO DO YOUR WORK, TAKE CARE OF THINGS AT HOME, OR GET ALONG WITH OTHER PEOPLE: NOT DIFFICULT AT ALL

## 2021-10-28 ASSESSMENT — PATIENT HEALTH QUESTIONNAIRE - PHQ9: SUM OF ALL RESPONSES TO PHQ QUESTIONS 1-9: 8

## 2021-10-28 ASSESSMENT — ANXIETY QUESTIONNAIRES: GAD7 TOTAL SCORE: 13

## 2021-11-03 ENCOUNTER — VIRTUAL VISIT (OUTPATIENT)
Dept: BEHAVIORAL HEALTH | Facility: CLINIC | Age: 31
End: 2021-11-03
Payer: COMMERCIAL

## 2021-11-03 DIAGNOSIS — F43.23 ADJUSTMENT DISORDER WITH MIXED ANXIETY AND DEPRESSED MOOD: Primary | ICD-10-CM

## 2021-11-03 NOTE — PROGRESS NOTES
[facility]: : Integrated Behavioral Health  November 3, 2021      Behavioral Health Clinician Progress Note    Patient Name: Sonia Candelario           Service Type:  Individual      Service Location:   [facility] / Email (patient not reached)     Session Start Time: 9:00am  Session End Time: 9:14am      Session Length: 14 minutes     Attendees: Client    Visit Activities (Refresh list every visit): [facility] Only    Diagnostic Assessment Date: Will be completed by the fourth visit  Treatment Plan Review Date: Provider and patient will continue to build rapport and discuss tx goals in their next few sessions.   Telemedicine Visit: The patient's condition can be safely assessed and treated via synchronous audio and visual telemedicine encounter.      Reason for Telemedicine Visit: Patient has requested telehealth visit and COVID-19    Originating Site (Patient Location): Patient's home    Distant Site (Provider Location): Provider Remote Setting- Home Office    Consent:  The patient/guardian has verbally consented to: the potential risks and benefits of telemedicine (video visit) versus in person care; bill my insurance or make self-payment for services provided; and responsibility for payment of non-covered services.     Mode of Communication:  Video Conference via [employer]    As the provider I attest to compliance with applicable laws and regulations related to telemedicine.    See Flowsheets for today's PHQ-9 and WALLACE-7 results  Previous PHQ-9:   PHQ-9 SCORE 1/11/2021 9/30/2021 10/27/2021   PHQ-9 Total Score MyChart - 3 (Minimal depression) 8 (Mild depression)   PHQ-9 Total Score 2 3 8     Previous WALLACE-7:   WALLACE-7 SCORE 10/27/2021   Total Score 13 (moderate anxiety)   Total Score 13       CHESTER LEVEL:  CHESTER Score (Last Two) 12/5/2011   CHESTER Raw Score 52   Activation Score 100   CHESTER Level 4       DATA  Extended Session (60+ minutes): No  Interactive Complexity:  No  Crisis: No  [facility] Patient: No    Treatment Objective(s) Addressed in This Session:  Target Behavior(s): Postpartum anxiety    Anxiety: will experience a reduction in anxiety and will develop more effective coping skills to manage anxiety symptoms  Adjustment Difficulties: will develop coping/problem-solving skills to facilitate more adaptive adjustment    Current Stressors / Issues:    Patient arrived in [facility] for her individual session. She reports that the last week has gone slightly better than the previous. She was able to receive some help from her parents with her oldest child over the weekend which was helpful and she has started resting when her son sleeps. Patient reports that napping or resting in the morning time has been helpful for her mood. Her son recently slept up to 3.5 hrs at a time which is progress. She states that she has been trying to avoid soy products but is finding difficulty in this due to it being in a lot of foods. Patient acknowledged compassion to herself if she does eat soy and isn't as fixated on her diet as much. Her  is also trying to test out formula as an option which so far has not been successful. Patient reports she has been using deep breathing, continuing to exercise and connecting with support system throughout the week. Encouraged patient to continue taking regulation breaks throughout the day. Scheduled follow up on 11/15/21.    Progress on Treatment Objective(s) / Homework:  Minimal progress - PREPARATION (Decided to change - considering how); Intervened by negotiating a change plan and determining options / strategies for behavior change, identifying triggers, exploring social supports, and working towards setting a date to begin behavior change    Motivational Interviewing    MI Intervention: Expressed Empathy/Understanding, Permission to raise concern or advise, Open-ended questions, Reflections: simple and complex and Change talk (evoked)     Change  Talk Expressed by the Patient: Desire to change Need to change Taking steps    Provider Response to Change Talk: E - Evoked more info from patient about behavior change and S - Summarized patient's change talk statements    Also provided psychoeducation about behavioral health condition, symptoms, and treatment options    Care Plan review completed: Yes    Medication Review:  No current psychiatric medications prescribed    Medication Compliance:  NA    Changes in Health Issues:   None reported    Chemical Use Review:   Substance Use: Chemical use reviewed, no active concerns identified      Tobacco Use: No current tobacco use.      Assessment: Current Emotional / Mental Status (status of significant symptoms):  Risk status (Self / Other harm or suicidal ideation)  Patient denies a history of suicidal ideation, suicide attempts, self-injurious behavior, homicidal ideation, homicidal behavior and and other safety concerns  Patient denies current fears or concerns for personal safety.  Patient denies current or recent suicidal ideation or behaviors.  Patient denies current or recent homicidal ideation or behaviors.  Patient denies current or recent self injurious behavior or ideation.  Patient denies other safety concerns.  A safety and risk management plan has not been developed at this time, however patient was encouraged to call [place] [phone] should there be a change in any of these risk factors.    Appearance:   Appropriate   Eye Contact:   Good   Psychomotor Behavior: Normal   Attitude:   Cooperative   Orientation:   All  Speech   Rate / Production: Normal/ Responsive   Volume:  Normal   Mood:    Normal  Affect:    Appropriate   Thought Content:  Clear   Thought Form:  Coherent  Logical  Circumstantial  Insight:    Good     Diagnoses:  1. Adjustment disorder with mixed anxiety and depressed mood        Collateral Reports Completed:  Routed note to PCP    Plan: (Homework, other):  Patient was given  information about behavioral services and encouraged to schedule a follow up appointment with the clinic [facility] in 1 week.  She was also given information about mental health symptoms and treatment options .  CD Recommendations: No indications of CD issues.    NAZIA Rivera      ______________________________________________________________________    Integrated Primary Care Behavioral Health Treatment Plan    Patient's Name: Sonia Candelario  YOB: 1990    Date: 11/3/21    DSM-V Diagnoses: Adjustment disorder with mixed anxiety and depressed mood  Psychosocial / Contextual Factors: Recently had a baby, has a 2-year-old and a 2-month-old, COVID-19, lack of sleep, infant son has digestive issues  WHODAS: [id number]    Referral / Collaboration:  Referral to another professional/service is not indicated at this time..    Anticipated number of session or this episode of care: 10+    Provider and patient will continue to build rapport and discuss tx goals in their next few sessions.     Patient has reviewed and agreed to the above plan.      NAZIA Rivera  November 3rd, 2021

## 2021-11-22 ENCOUNTER — THERAPY VISIT (OUTPATIENT)
Dept: PHYSICAL THERAPY | Facility: CLINIC | Age: 31
End: 2021-11-22
Payer: COMMERCIAL

## 2021-11-22 DIAGNOSIS — R10.2 PELVIC PAIN IN FEMALE: ICD-10-CM

## 2021-11-22 DIAGNOSIS — K59.00 CONSTIPATION: ICD-10-CM

## 2021-11-22 DIAGNOSIS — M62.89 HIGH-TONE PELVIC FLOOR DYSFUNCTION IN FEMALE: ICD-10-CM

## 2021-11-22 PROCEDURE — 97112 NEUROMUSCULAR REEDUCATION: CPT | Mod: GP | Performed by: PHYSICAL THERAPIST

## 2021-11-22 PROCEDURE — 97161 PT EVAL LOW COMPLEX 20 MIN: CPT | Mod: GP | Performed by: PHYSICAL THERAPIST

## 2021-11-22 PROCEDURE — 97530 THERAPEUTIC ACTIVITIES: CPT | Mod: GP | Performed by: PHYSICAL THERAPIST

## 2021-11-22 PROCEDURE — 97140 MANUAL THERAPY 1/> REGIONS: CPT | Mod: GP | Performed by: PHYSICAL THERAPIST

## 2021-11-22 NOTE — PROGRESS NOTES
Physical Therapy Initial Evaluation  Subjective:  The history is provided by the patient. No  was used.   Patient Health History  Sonia Fullermeek Candelario being seen for dyspareunia, frequent and incomplete voids.     Date of Onset: 3+ mos.   Problem occurred: post partum   Pain is reported as 4/10 on pain scale.  General health as reported by patient is excellent.  Pertinent medical history includes: none.   Red flags:  None as reported by patient.  Medical allergies: none.   Surgeries include:  None.    Current medications:  Other (prenatal vit, Ca and D).    Current occupation is PA.   Primary job tasks include:  Computer work and prolonged sitting.                  Therapist Generated HPI Evaluation  Problem details: Sonia is self referring post partum(date of delivery was 21) for dypareunia, voiding issues and core strength. She has a 1 yo and 3 mo old. , vaginal(periurethral tear with recent birth, healed in 2-3 wks). Sonia states she did do an online program for her [place] the first time and did well so that is not her main concern. She is breast feeding.     Pain-has had intercourse with pain both times. Feels very tight and unable to relax. Uses lubricant off and on. Pain with entering and deep. Pain was 4/10.   Bladder-Void times day are every 1-2 hrs, does not feel like she fully empties and can try again in 5-10 min and go. Will get a strong urge to go most of the time. No urinary leakage(no stress incontinence), no pad use. Stream is strong. Able to stop flow if needed. Night voids 3x, habitual when up with baby.   Fluids-80 oz water, 1 coffee in am, cup of tea in pm, will pick an herbal tea instead now  Fiber-does not track fiber but does eat a healthy diet of fruits, veggies, whole grains, is dairy free.   Bowel-Has a BM about 5x/wk. Type 2-4 on [place] stool chart. Does strain, does have a squatty potty. Does usually feel empty. Does have hemorrhoids(had a thrombosed one a  "couple weeks ago) Does use dulcolax about 1-2x/wk and has had to use vaginal splinting.   Exercise-has done 4 wks of \"ab rehab\" and then with [facility] the past 2 wks has not done so much. Was also doing a lot of walking until past several weeks and did just get a stationary bike.     Sonia gave permission for external/intravaginal exam today      .         Type of problem:  Pelvic dysfunction and other (pelvic pain, frequency, constipation).    This is a chronic condition.  Condition occurred with:  During pregnancy and after pregnancy.  Where condition occurred: for unknown reasons.  Patient reports pain:  Vaginal (does have some LBP with the abdominal weakness).  Pain is described as other and sharp (tightness) and is intermittent.  Pain is the same all the time.  Since onset symptoms are unchanged.  Symptoms are exacerbated by intercourse and other (if holds urine-pulling in driveway or key in door urgency)  and relieved by nothing and other (just feels tight).      Restrictions due to condition include:  Working in normal job without restrictions.  Barriers include:  None as reported by patient.                        Objective:  System                                 Pelvic Dysfunction Evaluation:        Flexibility:      Tightness not present at:  Adductors; Iliopsoas; Hamstrings; Piriformis or Gluteals    Abdominal Wall:  Abdominal wall pelvic: 2 FB above umbilicus and 1 at umbilicus.  Diastasis Recti:  Normal      Pelvic Clock Exam:    Ischiocavernosis pain:  -  Bulbocavernosis pain:  -  Transverse Perineal:  -  Levator ANI:  -  Perineal Body:  -  SI Provocation:    Positive for: ASLR  Negative for:  SI Compression and Fabres      Reflex Testing:  NA    External Assessment:  External assessment pelvic: main activation around anal area, limited ability to bulge/release PFM.  Skin Condition:  Normal      Tissue Symmetry:  Normal    Muscle Contraction/Perineal Mobility:  Slight lift, no urogential triangle " descent  Internal Assessment:  Internal assessment pelvic: high tone PFM B, with PL R 7/10 and L 8-9/10.  Sensory Exam:  Normal  Contraction/Grade:  Weak squeeze, 2 second hold (2)          SEMG Biofeedback:  Semg biofeedback pelvic: deferred with work on intravaginal NMR first, but will use BF for defecation techniques for proper pushing/relaxing AZ.                  Additional History:  Delivery History:  Vaginal delivery  Number of Pregnancies: 4  Number of Live Births: 2  Caffeine Consumption:  2 cups          Hip Evaluation    Hip Strength:    Flexion:   Left: 5/5   Pain:  Right: 5/5   Pain:                    Extension:  Left: 3/5  Pain:Right: 3/5    Pain:    Abduction:  Left: 3+/5     Pain:Right: 4+/5    Pain:    Internal Rotation:  Left: 5/5    Pain:Right: 5/5   Pain:  External Rotation:  Left: 4/5   Pain:  Right: 4/5   Pain:                           General     ROS    Assessment/Plan:    Patient is a 31 year old female with pelvic and core strength complaints.    Patient has the following significant findings with corresponding treatment plan.                Diagnosis 1:  Pelvic pain, high tone pelvic floor, constipation  Pain -  manual therapy, self management, education and home program  Decreased ROM/flexibility - manual therapy, therapeutic exercise and home program  Decreased strength - therapeutic exercise, therapeutic activities and home program  Decreased proprioception - neuro re-education, therapeutic activities and home program  Impaired muscle performance - biofeedback, neuro re-education and home program  Decreased function - therapeutic activities and home program    Therapy Evaluation Codes:   1) History comprised of:   Personal factors that impact the plan of care:      Past/current experiences and Time since onset of symptoms.    Comorbidity factors that impact the plan of care are:      None.     Medications impacting care: None.  2) Examination of Body Systems comprised of:   Body  structures and functions that impact the plan of care:      Lumbar spine, Pelvis and abdomen.   Activity limitations that impact the plan of care are:      Frequency, Pelvic Exam, [place], Urgency and defecation.  3) Clinical presentation characteristics are:   Stable/Uncomplicated.  4) Decision-Making    Low complexity using standardized patient assessment instrument and/or measureable assessment of functional outcome.  Cumulative Therapy Evaluation is: Low complexity.    Previous and current functional limitations:  (See Goal Flow Sheet for this information)    Short term and Long term goals: (See Goal Flow Sheet for this information)     Communication ability:  Patient appears to be able to clearly communicate and understand verbal and written communication and follow directions correctly.  Treatment Explanation - The following has been discussed with the patient:   RX ordered/plan of care  Anticipated outcomes  Possible risks and side effects  This patient would benefit from PT intervention to resume normal activities.   Rehab potential is good.    Frequency:  1 X week, once daily  Duration:  for 4-6 weeks tapering to 2 X a month over 1-2 months  Discharge Plan:  Achieve all LTG.  Independent in home treatment program.  Reach maximal therapeutic benefit.    Please refer to the daily flowsheet for treatment today, total treatment time and time spent performing 1:1 timed codes.

## 2021-11-24 ENCOUNTER — APPOINTMENT (OUTPATIENT)
Dept: URBAN - METROPOLITAN AREA CLINIC 256 | Age: 31
Setting detail: DERMATOLOGY
End: 2021-11-24

## 2021-11-24 VITALS — RESPIRATION RATE: 15 BRPM | WEIGHT: 135 LBS | HEIGHT: 66 IN

## 2021-11-24 DIAGNOSIS — Z71.89 OTHER SPECIFIED COUNSELING: ICD-10-CM

## 2021-11-24 DIAGNOSIS — L71.8 OTHER ROSACEA: ICD-10-CM

## 2021-11-24 DIAGNOSIS — L71.0 PERIORAL DERMATITIS: ICD-10-CM

## 2021-11-24 PROCEDURE — 99203 OFFICE O/P NEW LOW 30 MIN: CPT

## 2021-11-24 PROCEDURE — OTHER ADDITIONAL NOTES: OTHER

## 2021-11-24 PROCEDURE — OTHER COUNSELING: OTHER

## 2021-11-24 PROCEDURE — OTHER PRESCRIPTION: OTHER

## 2021-11-24 RX ORDER — METRONIDAZOLE 10 MG/60G
CREAM TOPICAL QAM
Qty: 60 | Refills: 3 | Status: ERX | COMMUNITY
Start: 2021-11-24

## 2021-11-24 ASSESSMENT — LOCATION DETAILED DESCRIPTION DERM
LOCATION DETAILED: LEFT UPPER CUTANEOUS LIP
LOCATION DETAILED: RIGHT INFERIOR CENTRAL MALAR CHEEK

## 2021-11-24 ASSESSMENT — LOCATION SIMPLE DESCRIPTION DERM
LOCATION SIMPLE: RIGHT CHEEK
LOCATION SIMPLE: LEFT LIP

## 2021-11-24 ASSESSMENT — LOCATION ZONE DERM
LOCATION ZONE: FACE
LOCATION ZONE: LIP

## 2021-11-24 NOTE — HPI: RASH
What Type Of Note Output Would You Prefer (Optional)?: Standard Output
Is This A New Presentation, Or A Follow-Up?: Rash
Additional History: Has been using OTC az acid with no improvement, she is nursing. Her baby is 3 months old.

## 2021-11-24 NOTE — PROCEDURE: ADDITIONAL NOTES
Detail Level: Simple
Render Risk Assessment In Note?: no
Additional Notes: Patient will call when done with nursing and will prescribe Minocycline.\\nSamples of Finacea given and cafe connect card from [facility] explained as well
Additional Notes: Sample of finacea was provided for patient.
Additional Notes: A clinical assistant was present for the exam. Care instructions were explained to the patient in detail. Told patient to call with any concerns or questions. The patient verbalized understanding and agreement of the education provided and the treatment plan. Encouraged patient to schedule a follow up appointment right after visit. At the end of the visit, all questions had been answered and the patient was satisfied with the visit.

## 2021-12-02 ENCOUNTER — LAB REQUISITION (OUTPATIENT)
Dept: LAB | Facility: CLINIC | Age: 31
End: 2021-12-02

## 2021-12-02 LAB — HBV SURFACE AB SERPL IA-ACNC: 331.7 M[IU]/ML

## 2021-12-02 PROCEDURE — 86706 HEP B SURFACE ANTIBODY: CPT | Performed by: INTERNAL MEDICINE

## 2021-12-02 PROCEDURE — 86481 TB AG RESPONSE T-CELL SUSP: CPT | Performed by: INTERNAL MEDICINE

## 2021-12-03 LAB
GAMMA INTERFERON BACKGROUND BLD IA-ACNC: 0.04 IU/ML
M TB IFN-G BLD-IMP: NEGATIVE
M TB IFN-G CD4+ BCKGRND COR BLD-ACNC: 9.96 IU/ML
MITOGEN IGNF BCKGRD COR BLD-ACNC: -0.01 IU/ML
MITOGEN IGNF BCKGRD COR BLD-ACNC: -0.02 IU/ML
QUANTIFERON MITOGEN: 10 IU/ML
QUANTIFERON NIL TUBE: 0.04 IU/ML
QUANTIFERON TB1 TUBE: 0.02 IU/ML
QUANTIFERON TB2 TUBE: 0.03

## 2021-12-06 ENCOUNTER — THERAPY VISIT (OUTPATIENT)
Dept: PHYSICAL THERAPY | Facility: CLINIC | Age: 31
End: 2021-12-06
Payer: COMMERCIAL

## 2021-12-06 DIAGNOSIS — K59.00 CONSTIPATION: ICD-10-CM

## 2021-12-06 DIAGNOSIS — R10.2 PELVIC PAIN IN FEMALE: ICD-10-CM

## 2021-12-06 DIAGNOSIS — M62.89 HIGH-TONE PELVIC FLOOR DYSFUNCTION IN FEMALE: ICD-10-CM

## 2021-12-06 PROCEDURE — 97112 NEUROMUSCULAR REEDUCATION: CPT | Mod: GP | Performed by: PHYSICAL THERAPIST

## 2021-12-06 PROCEDURE — 97110 THERAPEUTIC EXERCISES: CPT | Mod: GP | Performed by: PHYSICAL THERAPIST

## 2021-12-06 PROCEDURE — 97140 MANUAL THERAPY 1/> REGIONS: CPT | Mod: GP | Performed by: PHYSICAL THERAPIST

## 2021-12-13 ENCOUNTER — THERAPY VISIT (OUTPATIENT)
Dept: PHYSICAL THERAPY | Facility: CLINIC | Age: 31
End: 2021-12-13
Payer: COMMERCIAL

## 2021-12-13 DIAGNOSIS — K59.00 CONSTIPATION: ICD-10-CM

## 2021-12-13 DIAGNOSIS — M62.89 HIGH-TONE PELVIC FLOOR DYSFUNCTION IN FEMALE: ICD-10-CM

## 2021-12-13 DIAGNOSIS — R10.2 PELVIC PAIN IN FEMALE: ICD-10-CM

## 2021-12-13 PROCEDURE — 97110 THERAPEUTIC EXERCISES: CPT | Mod: GP | Performed by: PHYSICAL THERAPIST

## 2021-12-13 PROCEDURE — 97140 MANUAL THERAPY 1/> REGIONS: CPT | Mod: GP | Performed by: PHYSICAL THERAPIST

## 2021-12-13 PROCEDURE — 97112 NEUROMUSCULAR REEDUCATION: CPT | Mod: GP | Performed by: PHYSICAL THERAPIST

## 2021-12-20 ENCOUNTER — THERAPY VISIT (OUTPATIENT)
Dept: PHYSICAL THERAPY | Facility: CLINIC | Age: 31
End: 2021-12-20
Payer: COMMERCIAL

## 2021-12-20 DIAGNOSIS — K59.00 CONSTIPATION: ICD-10-CM

## 2021-12-20 DIAGNOSIS — M62.89 HIGH-TONE PELVIC FLOOR DYSFUNCTION IN FEMALE: ICD-10-CM

## 2021-12-20 DIAGNOSIS — R10.2 PELVIC PAIN IN FEMALE: ICD-10-CM

## 2021-12-20 PROCEDURE — 97110 THERAPEUTIC EXERCISES: CPT | Mod: GP | Performed by: PHYSICAL THERAPIST

## 2021-12-20 PROCEDURE — 97140 MANUAL THERAPY 1/> REGIONS: CPT | Mod: GP | Performed by: PHYSICAL THERAPIST

## 2021-12-20 PROCEDURE — 97112 NEUROMUSCULAR REEDUCATION: CPT | Mod: GP | Performed by: PHYSICAL THERAPIST

## 2021-12-21 ENCOUNTER — RX ONLY (RX ONLY)
Age: 31
End: 2021-12-21

## 2021-12-21 RX ORDER — AZELAIC ACID 0.15 G/G
AEROSOL, FOAM TOPICAL
Qty: 50 | Refills: 5 | Status: ERX | COMMUNITY
Start: 2021-12-21

## 2021-12-27 ENCOUNTER — THERAPY VISIT (OUTPATIENT)
Dept: PHYSICAL THERAPY | Facility: CLINIC | Age: 31
End: 2021-12-27
Payer: COMMERCIAL

## 2021-12-27 DIAGNOSIS — R10.2 PELVIC PAIN IN FEMALE: ICD-10-CM

## 2021-12-27 DIAGNOSIS — K59.00 CONSTIPATION: ICD-10-CM

## 2021-12-27 DIAGNOSIS — M62.89 HIGH-TONE PELVIC FLOOR DYSFUNCTION IN FEMALE: ICD-10-CM

## 2021-12-27 PROCEDURE — 97140 MANUAL THERAPY 1/> REGIONS: CPT | Mod: GP | Performed by: PHYSICAL THERAPIST

## 2021-12-27 PROCEDURE — 97112 NEUROMUSCULAR REEDUCATION: CPT | Mod: GP | Performed by: PHYSICAL THERAPIST

## 2021-12-27 PROCEDURE — 97110 THERAPEUTIC EXERCISES: CPT | Mod: GP | Performed by: PHYSICAL THERAPIST

## 2022-03-07 PROBLEM — K59.00 CONSTIPATION: Status: RESOLVED | Noted: 2021-11-22 | Resolved: 2022-03-07

## 2022-03-07 PROBLEM — M62.89 HIGH-TONE PELVIC FLOOR DYSFUNCTION IN FEMALE: Status: RESOLVED | Noted: 2021-11-22 | Resolved: 2022-03-07

## 2022-03-07 PROBLEM — R10.2 PELVIC PAIN IN FEMALE: Status: RESOLVED | Noted: 2021-11-22 | Resolved: 2022-03-07

## 2022-03-07 NOTE — PROGRESS NOTES
Discharge Note    Progress reporting period is from initial evaluation date (please see noted date below) to Dec 27, 2021.  Linked Episodes   Type: Episode: Status: Noted: Resolved: Last update: Updated by:   PHYSICAL THERAPY pelvic floor 11-22-21 Active 11/22/2021 12/27/2021  1:13 PM Roxana Ibrahim, PT      Comments:       Sonia needed to finish up with PT prior start of new job in January 2022.  Please see information below for last relevant information on current status.  Patient seen for 5 visits.    SUBJECTIVE  Subjective changes noted by patient:  The following is from last visit seen-Feels stronger with her PF Night voids about 1x, sex was less painful last time but currently has a house full of people so did not try this week. Progressing core ex as well. Will be starting new job next week and only day off is Friday. She will continue with her HEP and will f/u if needed after she is settled in the job. She was given the core progression through ptrx today.   .  Current pain level is  (2/10 with intercourse, 3/10 L OI with palpation).     Previous pain level was   (4/10 with intercourse, intravaginal palpation  R 7/10, L 8-9).   Changes in function:  Yes (See Goal flowsheet attached for changes in current functional level)  Adverse reaction to treatment or activity: None    OBJECTIVE  Changes noted in objective findings:The following is from last visit seen- AUA improved.  Improved rest tone in sitting(supine difficult), improved ability to activate and release PFM phasic and tonic. Improved elevator ex though down remains difficult to control in higher range. Central squeeze pressure 3/5. No pain to intravaginal palpation on R, L LA slight pain 3/10 reduced to 0/10 with MFR. Improved defecation techniques.      ASSESSMENT/PLAN  Diagnosis: pelvic pain(dyspareunia), high tone pelvic floor, constipation   Updated problem list and treatment plan:   Continue with HEP  STG/LTGs have been met or progress has been  made towards goals:  Yes, please see goal flowsheet for most current information  Assessment of Progress: current status is unknown.    Last current status:     Self Management Plans:  HEP  I have re-evaluated this patient and find that the nature, scope, duration and intensity of the therapy is appropriate for the medical condition of the patient.  Sonia continues to require the following intervention to meet STG and LTG's:  HEP.    Recommendations:  Discharge with current home program.  Patient to follow up with MD as needed.    Please refer to the daily flowsheet for treatment today, total treatment time and time spent performing 1:1 timed codes.

## 2022-03-31 ENCOUNTER — TELEPHONE (OUTPATIENT)
Dept: OBGYN | Facility: CLINIC | Age: 32
End: 2022-03-31
Payer: COMMERCIAL

## 2022-03-31 DIAGNOSIS — O92.13 CRACKED NIPPLE ASSOCIATED WITH LACTATION: ICD-10-CM

## 2022-03-31 NOTE — TELEPHONE ENCOUNTER
Please send to her pharmacy.    Mupirocin ointment 2%: 15 grams  Betamethasone ointment 0.1%: 15 grams  To which is added miconazole powder to a concentration of 2% miconazole       Total: about 30 grams combined. Apply sparingly after each feeding. Do not wash or wipe off.

## 2022-03-31 NOTE — TELEPHONE ENCOUNTER
RN received call from [facility] regarding APNO CREA ointment.    Pharmacist is asking for clarification on what strength and quantity for ingredients and for this to be listed in sig of prescription. (if including betamethasone or nystatin for example, to list strength and quantity for each)    RN routing to provider for advisement.     Marylin Montgomery RN on 3/31/2022 at 1:44 PM

## 2022-03-31 NOTE — TELEPHONE ENCOUNTER
RN attempted to call pharmacy to relay clarified order. RN was told prescription was cancelled by pharmacist and requesting to be resent electronically.    RN reordered prescription as written and copied/pasted providers advisement into sig per pharmacy request.    RN sent in order.    Marylin Montgomery RN on 3/31/2022 at 2:50 PM

## 2022-07-11 ASSESSMENT — ENCOUNTER SYMPTOMS
WEAKNESS: 0
DYSURIA: 0
HEADACHES: 0
EYE PAIN: 0
DIZZINESS: 0
ARTHRALGIAS: 0
FEVER: 0
COUGH: 0
NAUSEA: 0
CONSTIPATION: 0
JOINT SWELLING: 0
HEMATOCHEZIA: 0
HEMATURIA: 0
NERVOUS/ANXIOUS: 1
FREQUENCY: 0
PARESTHESIAS: 0
CHILLS: 0
BREAST MASS: 0
SORE THROAT: 0
DIARRHEA: 0
PALPITATIONS: 0
ABDOMINAL PAIN: 0
MYALGIAS: 0
HEARTBURN: 0
SHORTNESS OF BREATH: 0

## 2022-07-12 ENCOUNTER — OFFICE VISIT (OUTPATIENT)
Dept: FAMILY MEDICINE | Facility: CLINIC | Age: 32
End: 2022-07-12
Payer: COMMERCIAL

## 2022-07-12 VITALS
OXYGEN SATURATION: 99 % | WEIGHT: 134.4 LBS | BODY MASS INDEX: 21.6 KG/M2 | HEIGHT: 66 IN | RESPIRATION RATE: 13 BRPM | DIASTOLIC BLOOD PRESSURE: 72 MMHG | TEMPERATURE: 98.1 F | SYSTOLIC BLOOD PRESSURE: 127 MMHG | HEART RATE: 58 BPM

## 2022-07-12 DIAGNOSIS — F41.1 GENERALIZED ANXIETY DISORDER: ICD-10-CM

## 2022-07-12 DIAGNOSIS — Z00.00 ROUTINE GENERAL MEDICAL EXAMINATION AT A HEALTH CARE FACILITY: Primary | ICD-10-CM

## 2022-07-12 DIAGNOSIS — L71.0 PERIORAL DERMATITIS: ICD-10-CM

## 2022-07-12 PROCEDURE — 99395 PREV VISIT EST AGE 18-39: CPT | Performed by: PHYSICIAN ASSISTANT

## 2022-07-12 PROCEDURE — 99214 OFFICE O/P EST MOD 30 MIN: CPT | Mod: 25 | Performed by: PHYSICIAN ASSISTANT

## 2022-07-12 RX ORDER — SERTRALINE HYDROCHLORIDE 25 MG/1
25 TABLET, FILM COATED ORAL DAILY
Qty: 60 TABLET | Refills: 1 | Status: SHIPPED | OUTPATIENT
Start: 2022-07-12 | End: 2022-09-22

## 2022-07-12 RX ORDER — DOXYCYCLINE 100 MG/1
100 CAPSULE ORAL 2 TIMES DAILY
Qty: 60 CAPSULE | Refills: 3 | Status: SHIPPED | OUTPATIENT
Start: 2022-07-12 | End: 2022-08-11

## 2022-07-12 RX ORDER — METRONIDAZOLE 10 MG/G
GEL TOPICAL
COMMUNITY
Start: 2021-11-28 | End: 2023-04-07

## 2022-07-12 RX ORDER — AZELAIC ACID 0.15 G/G
AEROSOL, FOAM TOPICAL
COMMUNITY
Start: 2021-12-22 | End: 2023-04-07

## 2022-07-12 ASSESSMENT — ENCOUNTER SYMPTOMS
PARESTHESIAS: 0
BREAST MASS: 0
NAUSEA: 0
DIZZINESS: 0
ARTHRALGIAS: 0
WEAKNESS: 0
HEARTBURN: 0
PALPITATIONS: 0
FEVER: 0
HEMATOCHEZIA: 0
ABDOMINAL PAIN: 0
SHORTNESS OF BREATH: 0
CHILLS: 0
CONSTIPATION: 0
DYSURIA: 0
DIARRHEA: 0
JOINT SWELLING: 0
HEADACHES: 0
COUGH: 0
HEMATURIA: 0
SORE THROAT: 0
NERVOUS/ANXIOUS: 1
MYALGIAS: 0
FREQUENCY: 0
EYE PAIN: 0

## 2022-07-12 ASSESSMENT — PAIN SCALES - GENERAL: PAINLEVEL: NO PAIN (0)

## 2022-07-12 NOTE — PROGRESS NOTES
SUBJECTIVE:   CC: Sonia Candelario is an 31 year old woman who presents for preventive health visit.       Patient has been advised of split billing requirements and indicates understanding: Yes  Healthy Habits:     Getting at least 3 servings of Calcium per day:  Yes    Bi-annual eye exam:  NO    Dental care twice a year:  Yes    Sleep apnea or symptoms of sleep apnea:  None    Diet:  Other    Frequency of exercise:  2-3 days/week    Duration of exercise:  15-30 minutes    Taking medications regularly:  Yes    Medication side effects:  Not applicable and None    PHQ-2 Total Score: 0    Additional concerns today:  No      Abnormal Mood Symptoms      Duration: 1 years worse, but has been present for multiple years     Description:  Depression: no   Anxiety: YES- generalized  Panic attacks: no     Accompanying signs and symptoms: see PHQ-9 and WALLACE scores    History (similar episodes/previous evaluation): after last child's birth    Precipitating or alleviating factors: None    Therapies tried and outcome: therapy was not really helpful in the past, but patient is seeing a psychologist again  Patient is currently tapering off breastfeeding and plans to completely stop in 2 weeks   Perioral dermititis      Duration: several months     Description (location/character/radiation): red pumps around the mouth and nose    Intensity:  moderate    Accompanying signs and symptoms: none    History (similar episodes/previous evaluation):     Precipitating or alleviating factors:     Therapies tried and outcome: Metronidazole does not help       Today's PHQ-2 Score:   PHQ-2 ( 1999 Pf[employer]) 7/11/2022   Q1: Little interest or pleasure in doing things 0   Q2: Feeling down, depressed or hopeless 0   PHQ-2 Score 0   PHQ-2 Total Score (12-17 Years)- Positive if 3 or more points; Administer PHQ-A if positive -   Q1: Little interest or pleasure in doing things Not at all   Q2: Feeling down, depressed or hopeless Not at all    PHQ-2 Score 0       Abuse: Current or Past (Physical, Sexual or Emotional) - No  Do you feel safe in your environment? Yes        Social History     Tobacco Use     Smoking status: Never Smoker     Smokeless tobacco: Never Used   Substance Use Topics     Alcohol use: Yes     Comment: Occasionally, 2x/wk     If you drink alcohol do you typically have >3 drinks per day or >7 drinks per week? No    Alcohol Use 7/12/2022   Prescreen: >3 drinks/day or >7 drinks/week? -   Prescreen: >3 drinks/day or >7 drinks/week? No       Reviewed orders with patient.  Reviewed health maintenance and updated orders accordingly - Yes  Patient Active Problem List   Diagnosis     CARDIOVASCULAR SCREENING; LDL GOAL LESS THAN 160     Supervision of other normal pregnancy, antepartum     Past Surgical History:   Procedure Laterality Date     COLPOSCOPY CERVIX, BIOPSY CERVIX, ENDOCERVICAL CURETTAGE, COMBINED  2010    moderate dysplasia     HC COLP CERVIX/UPPER VAGINA W LOOP ELEC BX CERVIX  2010     HC INSERTION INTRAUTERINE DEVICE  2011, 2016    [facility], Estrellita     HC REMOVE INTRAUTERINE DEVICE  2016, 2018     HC REPAIR OF NASAL SEPTUM  2006       Social History     Tobacco Use     Smoking status: Never Smoker     Smokeless tobacco: Never Used   Substance Use Topics     Alcohol use: Yes     Comment: Occasionally, 2x/wk     Family History   Problem Relation Age of Onset     Diabetes No family hx of      Cancer No family hx of      Thyroid Disease No family hx of      Lupus No family hx of      Thrombophilia No family hx of      C.A.D. No family hx of      Psoriasis No family hx of      Eczema No family hx of      Melanoma No family hx of          Current Outpatient Medications   Medication Sig Dispense Refill     doxycycline monohydrate (MONODOX) 100 MG capsule Take 1 capsule (100 mg) by mouth 2 times daily for 30 days 60 capsule 3     levonorgestrel (MIRENA) 20 MCG/24HR IUD 1 each (20 mcg) by Intrauterine route once       Prenatal  Multivit-Min-Fe-FA (PRENATAL VITAMINS PO)        sertraline (ZOLOFT) 25 MG tablet Take 1 tablet (25 mg) by mouth daily Start with 25 mg daily for 1 week then increase to 50 mg thereafter 60 tablet 1     FINACEA 15 % FOAM APPLY TO FACE ONCE TO TWICE DAILY AS NEEDED       metroNIDAZOLE (METROGEL) 1 % external gel APPLY A THIN LAYER TO RASH ON FACE IN THE MORNING FOR PERIORAL DERMATITIS UNTIL IMPROVED       No Known Allergies    Breast Cancer Screening:    FHS-7: No flowsheet data found.  click delete button to remove this line now  Patient under 40 years of age: Routine Mammogram Screening not recommended.   Pertinent mammograms are reviewed under the imaging tab.    History of abnormal Pap smear: NO - age 30-65 PAP every 5 years with negative HPV co-testing recommended  PAP / HPV Latest Ref Rng & Units 10/21/2019 5/13/2013 5/16/2012   PAP (Historical) - NIL NIL NIL   HPV16 NEG:Negative Negative - -   HPV18 NEG:Negative Negative - -   HRHPV NEG:Negative Negative - -     Reviewed and updated as needed this visit by clinical staff   Tobacco  Allergies  Meds  Problems  Med Hx  Surg Hx  Fam Hx  Soc   Hx          Reviewed and updated as needed this visit by Provider   Tobacco  Allergies  Meds  Problems  Med Hx  Surg Hx  Fam Hx               Review of Systems   Constitutional: Negative for chills and fever.   HENT: Negative for congestion, ear pain, hearing loss and sore throat.    Eyes: Negative for pain and visual disturbance.   Respiratory: Negative for cough and shortness of breath.    Cardiovascular: Negative for chest pain, palpitations and peripheral edema.   Gastrointestinal: Negative for abdominal pain, constipation, diarrhea, heartburn, hematochezia and nausea.   Breasts:  Negative for tenderness, breast mass and discharge.   Genitourinary: Negative for dysuria, frequency, genital sores, hematuria, pelvic pain, urgency, vaginal bleeding and vaginal discharge.   Musculoskeletal: Negative for  "arthralgias, joint swelling and myalgias.   Skin: Negative for rash.   Neurological: Negative for dizziness, weakness, headaches and paresthesias.   Psychiatric/Behavioral: Positive for mood changes. The patient is nervous/anxious.      CONSTITUTIONAL: NEGATIVE for fever, chills, change in weight  INTEGUMENTARU/SKIN: NEGATIVE for worrisome rashes, moles or lesions  EYES: NEGATIVE for vision changes or irritation  ENT: NEGATIVE for ear, mouth and throat problems  RESP: NEGATIVE for significant cough or SOB  BREAST: NEGATIVE for masses, tenderness or discharge  CV: NEGATIVE for chest pain, palpitations or peripheral edema  GI: NEGATIVE for nausea, abdominal pain, heartburn, or change in bowel habits  : NEGATIVE for unusual urinary or vaginal symptoms. Periods are regular.  MUSCULOSKELETAL: NEGATIVE for significant arthralgias or myalgia  NEURO: NEGATIVE for weakness, dizziness or paresthesias  PSYCHIATRIC: NEGATIVE for changes in mood or affect     OBJECTIVE:   /72 (BP Location: Left arm, Patient Position: Sitting, Cuff Size: Adult Regular)   Pulse 58   Temp 98.1  F (36.7  C) (Tympanic)   Resp 13   Ht 1.676 m (5' 6\")   Wt 61 kg (134 lb 6.4 oz)   SpO2 99%   BMI 21.69 kg/m    Physical Exam  GENERAL: healthy, alert and no distress  EYES: Eyes grossly normal to inspection, PERRL and conjunctivae and sclerae normal  HENT: ear canals and TM's normal, nose and mouth without ulcers or lesions  NECK: no adenopathy, no asymmetry, masses, or scars and thyroid normal to palpation  RESP: lungs clear to auscultation - no rales, rhonchi or wheezes  BREAST: normal without masses, tenderness or nipple discharge and no palpable axillary masses or adenopathy  CV: regular rate and rhythm, normal S1 S2, no S3 or S4, no murmur, click or rub, no peripheral edema and peripheral pulses strong  ABDOMEN: soft, nontender, no hepatosplenomegaly, no masses and bowel sounds normal  MS: no gross musculoskeletal defects noted, no " "edema  SKIN: no suspicious lesions or rashes  NEURO: Normal strength and tone, mentation intact and speech normal  PSYCH: mentation appears normal, affect normal/bright    Diagnostic Test Results:  Labs reviewed in Epic    ASSESSMENT/PLAN:   Sonia was seen today for physical and anxiety.    Diagnoses and all orders for this visit:    Routine general medical examination at a health care facility    Generalized anxiety disorder  -     sertraline (ZOLOFT) 25 MG tablet; Take 1 tablet (25 mg) by mouth daily Start with 25 mg daily for 1 week then increase to 50 mg thereafter    Perioral dermatitis  -     doxycycline monohydrate (MONODOX) 100 MG capsule; Take 1 capsule (100 mg) by mouth 2 times daily for 30 days      Start Sertraline 25 mg qs for 1 week then increase to 50 mg thereafter -not advised to take while breastfeeding unless necessary.   Se and black box warning were discussed. Patient has no SI, feels safe  Start therapy  Follow up in 4-6 weeks -    Doxycyline 100 mg twice a day for 30 days, then may continue as needed -do not take this medication while breastfeeding , may start only after stopped breastfeeding completely     Patient has been advised of split billing requirements and indicates understanding: Yes    COUNSELING:  Reviewed preventive health counseling, as reflected in patient instructions       Regular exercise       Healthy diet/nutrition    Estimated body mass index is 21.69 kg/m  as calculated from the following:    Height as of this encounter: 1.676 m (5' 6\").    Weight as of this encounter: 61 kg (134 lb 6.4 oz).        She reports that she has never smoked. She has never used smokeless tobacco.      Counseling Resources:  ATP IV Guidelines  Pooled Cohorts Equation Calculator  Breast Cancer Risk Calculator  BRCA-Related Cancer Risk Assessment: FHS-7 Tool  FRAX Risk Assessment  ICSI Preventive Guidelines  Dietary Guidelines for Americans, 2010  USDA's MyPlate  ASA Prophylaxis  Lung CA " Screening    Sandra Dodge PA-C  [facility]

## 2022-07-12 NOTE — PATIENT INSTRUCTIONS
At [facility], we strive to deliver an exceptional experience to you, every time we see you. If you receive a survey, please complete it as we do value your feedback.  If you have MyChart, you can expect to receive results automatically within 24 hours of their completion.  Your provider will send a note interpreting your results as well.   If you do not have MyChart, you should receive your results in about a week by mail.    Your care team:                            Family Medicine Internal Medicine   MD Andrew Johnson MD Shantel Branch-Fleming, MD Srinivasa Vaka, MD Katya Belousova, RUTH FooteHillDEBBY Yu CNP, MD Pediatrics   Minor Shea, MD Nasreen Arellano MD Amelia Massimini APRN CNP   Zaida Valero APRN CNP Deinse Kerr MD             Clinic hours: Monday - Thursday 7 am-6 pm; Fridays 7 am-5 pm.   Urgent care: Monday - Friday 10 am- 8 pm; Saturday and Sunday 9 am-5 pm.    Clinic: [phone]       [facility]: Monday - Thursday 8 am - 7 pm; Friday 8 am - 6 pm  [facility]: [phone]     Preventive Health Recommendations  Female Ages 26 - 39  Yearly exam:   See your health care provider every year in order to  Review health changes.   Discuss preventive care.    Review your medicines if you your doctor has prescribed any.    Until age 30: Get a Pap test every three years (more often if you have had an abnormal result).    After age 30: Talk to your doctor about whether you should have a Pap test every 3 years or have a Pap test with HPV screening every 5 years.   You do not need a Pap test if your uterus was removed (hysterectomy) and you have not had cancer.  You should be tested each year for STDs (sexually transmitted diseases), if you're at risk.   Talk to your provider about how often to have your cholesterol checked.  If you are at risk for  diabetes, you should have a diabetes test (fasting glucose).  Shots: Get a flu shot each year. Get a tetanus shot every 10 years.   Nutrition:   Eat at least 5 servings of fruits and vegetables each day.  Eat whole-grain bread, whole-wheat pasta and brown rice instead of white grains and rice.  Get adequate Calcium and Vitamin D.     Lifestyle  Exercise at least 150 minutes a week (30 minutes a day, 5 days of the week). This will help you control your weight and prevent disease.  Limit alcohol to one drink per day.  No smoking.   Wear sunscreen to prevent skin cancer.  See your dentist every six months for an exam and cleaning.

## 2022-08-16 ENCOUNTER — E-VISIT (OUTPATIENT)
Dept: URGENT CARE | Facility: CLINIC | Age: 32
End: 2022-08-16
Payer: COMMERCIAL

## 2022-08-16 DIAGNOSIS — N30.00 ACUTE CYSTITIS WITHOUT HEMATURIA: Primary | ICD-10-CM

## 2022-08-16 PROCEDURE — 99421 OL DIG E/M SVC 5-10 MIN: CPT | Performed by: EMERGENCY MEDICINE

## 2022-08-16 RX ORDER — NITROFURANTOIN 25; 75 MG/1; MG/1
100 CAPSULE ORAL 2 TIMES DAILY
Qty: 10 CAPSULE | Refills: 0 | Status: SHIPPED | OUTPATIENT
Start: 2022-08-16 | End: 2022-08-21

## 2022-08-16 NOTE — PATIENT INSTRUCTIONS
Dear Sonia Candelario    After reviewing your responses, I've been able to diagnose you with a urinary tract infection, which is a common infection of the bladder with bacteria.  This is not a sexually transmitted infection, though urinating immediately after intercourse can help prevent infections.  Drinking lots of fluids is also helpful to clear your current infection and prevent the next one.      I have sent a prescription for antibiotics to your pharmacy to treat this infection.    It is important that you take all of your prescribed medication even if your symptoms are improving after a few doses.  Taking all of your medicine helps prevent the symptoms from returning.     If your symptoms worsen, you develop pain in your back or stomach, develop fevers, or are not improving in 5 days, please contact your primary care provider for an appointment or visit any of our convenient Walk-in or Urgent Care Centers to be seen, which can be found on our website here.    Thanks again for choosing us as your health care partner,    Frandy Velazco MD    Urinary Tract Infections in Women  Urinary tract infections (UTIs) are most often caused by bacteria. These bacteria enter the urinary tract. The bacteria may come from inside the body. Or they may travel from the skin outside the rectum or vagina into the urethra. Female anatomy makes it easy for bacteria from the bowel to enter a woman s urinary tract, which is the most common source of UTI. This means women develop UTIs more often than men. Pain in or around the urinary tract is a common UTI symptom. But the only way to know for sure if you have a UTI for the healthcare provider to test your urine. The two tests that may be done are the urinalysis and urine culture.     Types of UTIs    Cystitis. A bladder infection (cystitis) is the most common UTI in women. You may have urgent or frequent need to pee. You may also have pain, burning when you pee, and  bloody urine.    Urethritis. This is an inflamed urethra, which is the tube that carries urine from the bladder to outside the body. You may have lower stomach or back pain. You may also have urgent or frequent need to pee.    Pyelonephritis. This is a kidney infection. If not treated, it can be serious and damage your kidneys. In severe cases, you may need to stay in the hospital. You may have a fever and lower back pain.    Medicines to treat a UTI  Most UTIs are treated with antibiotics. These kill the bacteria. The length of time you need to take them depends on the type of infection. It may be as short as 3 days. If you have repeated UTIs, you may need a low-dose antibiotic for several months. Take antibiotics exactly as directed. Don t stop taking them until all of the medicine is gone. If you stop taking the antibiotic too soon, the infection may not go away. You may also develop a resistance to the antibiotic. This can make it much harder to treat.   Lifestyle changes to treat and prevent UTIs   The lifestyle changes below will help get rid of your UTI. They may also help prevent future UTIs.     Drink plenty of fluids. This includes water, juice, or other caffeine-free drinks. Fluids help flush bacteria out of your body.    Empty your bladder. Always empty your bladder when you feel the urge to pee. And always pee before going to sleep. Urine that stays in your bladder can lead to infection. Try to pee before and after sex as well.    Practice good personal hygiene. Wipe yourself from front to back after using the toilet. This helps keep bacteria from getting into the urethra.    Use condoms during sex. These help prevent UTIs caused by sexually transmitted bacteria. Also don't use spermicides during sex. These can increase the risk for UTIs. Choose other forms of birth control instead. For women who tend to get UTIs after sex, a low-dose of a preventive antibiotic may be used. Be sure to discuss this  option with your healthcare provider.    Follow up with your healthcare provider as directed. He or she may test to make sure the infection has cleared. If needed, more treatment may be started.  Andie last reviewed this educational content on 7/1/2019 2000-2021 [employer]. All rights reserved. This information is not intended as a substitute for professional medical care. Always follow your healthcare professional's instructions.

## 2022-09-03 ENCOUNTER — HEALTH MAINTENANCE LETTER (OUTPATIENT)
Age: 32
End: 2022-09-03

## 2023-04-07 ENCOUNTER — MYC MEDICAL ADVICE (OUTPATIENT)
Dept: FAMILY MEDICINE | Facility: CLINIC | Age: 33
End: 2023-04-07

## 2023-04-07 ENCOUNTER — OFFICE VISIT (OUTPATIENT)
Dept: FAMILY MEDICINE | Facility: CLINIC | Age: 33
End: 2023-04-07
Payer: COMMERCIAL

## 2023-04-07 VITALS
TEMPERATURE: 98 F | WEIGHT: 143.6 LBS | SYSTOLIC BLOOD PRESSURE: 110 MMHG | HEIGHT: 66 IN | RESPIRATION RATE: 20 BRPM | DIASTOLIC BLOOD PRESSURE: 74 MMHG | BODY MASS INDEX: 23.08 KG/M2 | HEART RATE: 78 BPM | OXYGEN SATURATION: 100 %

## 2023-04-07 DIAGNOSIS — L29.3 PRURITUS OF GENITALIA: ICD-10-CM

## 2023-04-07 DIAGNOSIS — N94.818 VULVAR DISCOMFORT: ICD-10-CM

## 2023-04-07 DIAGNOSIS — L71.0 PERIORAL DERMATITIS: ICD-10-CM

## 2023-04-07 DIAGNOSIS — B37.31 YEAST INFECTION OF THE VAGINA: ICD-10-CM

## 2023-04-07 DIAGNOSIS — T83.32XA INTRAUTERINE CONTRACEPTIVE DEVICE THREADS LOST, INITIAL ENCOUNTER: Primary | ICD-10-CM

## 2023-04-07 DIAGNOSIS — T83.32XA INTRAUTERINE CONTRACEPTIVE DEVICE THREADS LOST, INITIAL ENCOUNTER: ICD-10-CM

## 2023-04-07 DIAGNOSIS — L29.3 PRURITUS OF GENITALIA: Primary | ICD-10-CM

## 2023-04-07 DIAGNOSIS — Z12.4 SCREENING FOR MALIGNANT NEOPLASM OF CERVIX: ICD-10-CM

## 2023-04-07 DIAGNOSIS — N94.818 VULVAR DISCOMFORT: Primary | ICD-10-CM

## 2023-04-07 LAB
CLUE CELLS: ABNORMAL
HCG UR QL: NEGATIVE
TRICHOMONAS, WET PREP: ABNORMAL
WBC'S/HIGH POWER FIELD, WET PREP: ABNORMAL
YEAST, WET PREP: ABNORMAL

## 2023-04-07 PROCEDURE — G0145 SCR C/V CYTO,THINLAYER,RESCR: HCPCS | Performed by: NURSE PRACTITIONER

## 2023-04-07 PROCEDURE — 87491 CHLMYD TRACH DNA AMP PROBE: CPT | Performed by: NURSE PRACTITIONER

## 2023-04-07 PROCEDURE — 87210 SMEAR WET MOUNT SALINE/INK: CPT | Performed by: NURSE PRACTITIONER

## 2023-04-07 PROCEDURE — 87591 N.GONORRHOEAE DNA AMP PROB: CPT | Performed by: NURSE PRACTITIONER

## 2023-04-07 PROCEDURE — 99214 OFFICE O/P EST MOD 30 MIN: CPT | Performed by: NURSE PRACTITIONER

## 2023-04-07 PROCEDURE — 87624 HPV HI-RISK TYP POOLED RSLT: CPT | Performed by: NURSE PRACTITIONER

## 2023-04-07 PROCEDURE — 81025 URINE PREGNANCY TEST: CPT | Performed by: NURSE PRACTITIONER

## 2023-04-07 RX ORDER — FLUCONAZOLE 150 MG/1
150 TABLET ORAL ONCE
Qty: 2 TABLET | Refills: 0 | Status: SHIPPED | OUTPATIENT
Start: 2023-04-07 | End: 2023-04-07

## 2023-04-07 RX ORDER — TRIAMCINOLONE ACETONIDE 1 MG/G
OINTMENT TOPICAL 2 TIMES DAILY
Qty: 30 G | Refills: 0 | Status: SHIPPED | OUTPATIENT
Start: 2023-04-07 | End: 2023-08-04

## 2023-04-07 ASSESSMENT — PAIN SCALES - GENERAL: PAINLEVEL: NO PAIN (0)

## 2023-04-07 NOTE — RESULT ENCOUNTER NOTE
Rasheed Ramy,    Thank you for your recent office visit.    Here are your recent results.  I will send in that topical steroid as you had discussed that previously helped in the past.    Feel free to contact me via [employer] or call the clinic at [phone].    Sincerely,    DEBBY Campbell, FNP-BC

## 2023-04-07 NOTE — PROGRESS NOTES
Assessment & Plan       Vulvar discomfort    - NEISSERIA GONORRHOEA PCR  - CHLAMYDIA TRACHOMATIS PCR  - Wet prep - Clinic Collect  - fluconazole (DIFLUCAN) 150 MG tablet; Take 1 tablet (150 mg) by mouth once for 1 dose If symptoms persist on day 3 take second tablet.  We will let you know lab results they will come through MyChart as soon as a result.    Itching    - Wet prep - Clinic Collect    Pruritus of genitalia    - Wet prep - Clinic Collect  - fluconazole (DIFLUCAN) 150 MG tablet; Take 1 tablet (150 mg) by mouth once for 1 dose If symptoms persist on day 3 take second tablet.  We will let you know lab results they will come through MyChart as soon as a result.    Intrauterine contraceptive device threads lost, initial encounter    - HCG Qual, Urine ([id number]); Future  - US Pelvic Complete with Transvaginal; Future    Yeast infection of the vagina    - fluconazole (DIFLUCAN) 150 MG tablet; Take 1 tablet (150 mg) by mouth once for 1 dose If symptoms persist on day 3 take second tablet.  We will let you know lab results they will come through MyChart as soon as a result.    Perioral dermatitis    - Adult Dermatology Referral; Future    Screening for malignant neoplasm of cervix    - Pap screen with HPV - recommended age 30 - 65 years    25 minutes spent by me on the date of the encounter doing chart review, history and exam, documentation and further activities per the note     See Patient Instructions: Schedule with dermatology, lab results will come through MyChart as soon as they are available if abnormal we will let you know a treatment plan.  If negative urine pregnancy test take Diflucan as prescribed.  Schedule pelvic ultrasound.  Review after visit summary tips.  Follow-up as needed for healthcare questions or concerns.    NAYELY GUZMAN  [facility] NICHOLAS Scott is a 32 year old, presenting for the following health issues:  Vaginal Problem        4/7/2023      1:16 PM   Additional Questions   Roomed by Ingrid     Vaginal Problem     History of Present Illness       Reason for visit:  Vulvovaginitis, cant feel iud strings  Symptom onset:  3-7 days ago  Symptoms include:  Vulvar erythema, pruritis without discharge.  Symptom intensity:  Moderate  Symptom progression:  Staying the same  Had these symptoms before:  Yes  Has tried/received treatment for these symptoms:  Yes  Previous treatment was successful:  Yes  Prior treatment description:  Triamcinolone    She eats 2-3 servings of fruits and vegetables daily.She consumes 0 sweetened beverage(s) daily.She exercises with enough effort to increase her heart rate 30 to 60 minutes per day.  She exercises with enough effort to increase her heart rate 4 days per week.   She is taking medications regularly.     She reports she had lower pelvic pain for 10 days that resolved.  She had a normal period.  She reports she has not been able to feel her IUD strings for the last 2 months.  She denies urinary symptoms, no fever no vaginal odor low back pain.  She has had vulvar redness, pain, itching.  She did use over-the-counter miconazole treatment for 3 days she feels the suppositories did not help at all.  The topical medication helped some.  She does have a history of abnormal Paps approximately 10 years ago she had a procedure at that time and it has had normal Paps since.  She does not believe she is at risk for STD.  She is unsure if discharge due to using vaginal suppositories.  She is okay for pelvic exam today due to absence of IUD strings.  We will complete wet prep, GC swab, Pap-last Pap 3 years ago.    Would like referral to dermatology has been dealing with perioral dermatitis for a year, has been on doxycycline without improvement.  Referral placed.    We will order pelvic ultrasound due to loss of IUD strings.    Vaginal Symptoms  Onset/Duration: 3-7 days   Description:  Vaginal Discharge: none   Itching (Pruritis):  "YES  Burning sensation:  YES  Odor: No  Accompanying Signs & Symptoms:  Urinary symptoms: No  Abdominal pain: YES- off and on   Fever: No  History:   Sexually active: YES  New Partner: No  Possibility of Pregnancy: Unsure, has IUD and can't feel IUD strings x 2 months   Recent antibiotic use: YES- doxycycline   Previous vaginitis issues: YES  Precipitating or alleviating factors: tight clothes- precipitating; miconazole cream - alleviating factor  Therapies tried and outcome: Monistat suppository and cream, only cream helped and probiotics (only when taking doxycycline) - doesn't seem to help           Review of Systems   Genitourinary: Positive for vaginal discharge.      Constitutional, HEENT, cardiovascular, pulmonary, GI, , musculoskeletal, neuro, skin, endocrine and psych systems are negative, except as otherwise noted.      Objective    /74   Pulse 78   Temp 98  F (36.7  C) (Temporal)   Resp 20   Ht 1.685 m (5' 6.34\")   Wt 65.1 kg (143 lb 9.6 oz)   LMP 03/13/2023 (Exact Date)   SpO2 100%   Breastfeeding No   BMI 22.94 kg/m    Body mass index is 22.94 kg/m .  Physical Exam   GENERAL: Healthy, alert and no distress  EYES: Eyes grossly normal to inspection.  No discharge or erythema, or obvious scleral/conjunctival abnormalities.  RESP: No audible wheeze, cough, or visible cyanosis.  No visible retractions or increased work of breathing.    SKIN: Visible skin clear. No significant rash, abnormal pigmentation or lesions.  NEURO: Cranial nerves grossly intact.  Mentation and speech appropriate for age.  PSYCH: Mentation appears normal, affect normal/bright, judgement and insight intact, normal speech and appearance well-groomed.   (female): POSITIVE female external genitalia erythematous, mildly edematous, normal urethral meatus, vaginal mucosa erythematous, moist, well rugated, and cervix erythematous, no IUD strings seen /adnexa/uterus without masses white, thick discharge present consistent " with yeast.  Will complete UPT prior to treating with diflucan.  Will await wet prep, g/c and pap results.     Pelvic ultrasound, wet prep, GC swab, Pap

## 2023-04-07 NOTE — RESULT ENCOUNTER NOTE
Rasheed Scott,    Thank you for your recent office visit.    Here are your recent results.  The wet prep did not show yeast but I visually saw a yeast so I think the Diflucan will help.  Please let me know if it does not.  You are not currently pregnant.  Please schedule the pelvic ultrasound we will let you know the other results when they come back.    Feel free to contact me via [employer] or call the clinic at [phone].    Sincerely,    DEBBY Campbell, FNP-BC

## 2023-04-08 LAB
C TRACH DNA SPEC QL NAA+PROBE: NEGATIVE
N GONORRHOEA DNA SPEC QL NAA+PROBE: NEGATIVE

## 2023-04-10 ENCOUNTER — ANCILLARY PROCEDURE (OUTPATIENT)
Dept: ULTRASOUND IMAGING | Facility: CLINIC | Age: 33
End: 2023-04-10
Attending: NURSE PRACTITIONER
Payer: COMMERCIAL

## 2023-04-10 DIAGNOSIS — T83.32XA INTRAUTERINE CONTRACEPTIVE DEVICE THREADS LOST, INITIAL ENCOUNTER: ICD-10-CM

## 2023-04-10 PROCEDURE — 76830 TRANSVAGINAL US NON-OB: CPT | Mod: TC | Performed by: RADIOLOGY

## 2023-04-10 PROCEDURE — 76856 US EXAM PELVIC COMPLETE: CPT | Mod: TC | Performed by: RADIOLOGY

## 2023-04-11 LAB
BKR LAB AP GYN ADEQUACY: NORMAL
BKR LAB AP GYN INTERPRETATION: NORMAL
BKR LAB AP HPV REFLEX: NORMAL
BKR LAB AP LMP: NORMAL
BKR LAB AP PREVIOUS ABNORMAL: NORMAL
PATH REPORT.COMMENTS IMP SPEC: NORMAL
PATH REPORT.COMMENTS IMP SPEC: NORMAL
PATH REPORT.RELEVANT HX SPEC: NORMAL

## 2023-04-11 NOTE — PROGRESS NOTES
Assessment & Plan     Intrauterine contraceptive device threads lost, subsequent encounter  We discussed her IUD strings-at this time, we would not recommend trying to locate the strings as we know the IUD is present and appropriately positioned, risk dislodging the IUD if we try to find the strings. Discussed they may come out of the cervix on their own. If she has any concern about presence of the IUD, can plan ultrasound as needed. Patient is contemplating permanent sterilization as well.     Vulvar irritation  Itching has improved. Patient will try the topical steroid BID for 7 days and can take second dose of Diflucan at that time if symptoms still present. Let me know if symptoms persist after that. Reviewed additional home care measures. Patient is given an opportunity to ask questions and have them answered.  {  DE[facility]    Shane Scott is a 32 year old, presenting for the following health issues:  Consult (Intrauterine contraceptive device threads lost)    HPI     Consult- Intrauterine contraceptive device threads lost    Patient was initially scheduled for evaluation of pelvic pain and concerns of not feeling her IUD strings. Was able to see primary care last week and her pelvic pain had resolved by then, but was experiencing vulvar discomfort as well.   Patient had negative wet prep and STI screening. Was prescribed topical steroid to use BID and was given 2 doses of Diflucan. Did take 1 dose, then had her cycle and has not yet taken the second dose.  Admits to not using the steroids twice daily and did not use yesterday or today yet. Feels the itching has improved, but still feels irritated/red on the vulva. No new products, but stayed in a hotel the week prior to the symptoms beginning.  Does still get regular cycles with the Mirena IUD. Ultrasound was done confirming the IUD was present and appropriately positioned.     Review of Systems  "  Constitutional, HEENT, cardiovascular, pulmonary, gi and gu systems are negative, except as otherwise noted.      Objective    /69 (BP Location: Right arm, Patient Position: Sitting, Cuff Size: Adult Regular)   Pulse 68   Ht 1.685 m (5' 6.34\")   Wt 64.5 kg (142 lb 3.2 oz)   LMP 03/13/2023 (Exact Date)   SpO2 98%   BMI 22.72 kg/m    Body mass index is 22.72 kg/m .  Physical Exam   GENERAL: healthy, alert and no distress   (female): external genitalia-mildly erythematous, normal urethral meatus and vaginal mucosa pink, moist, well rugated. Cervix is normal in appearance, IUD strings not visible  MS: no gross musculoskeletal defects noted, no edema  SKIN: no suspicious lesions or rashes  PSYCH: mentation appears normal, affect normal/bright    "

## 2023-04-11 NOTE — RESULT ENCOUNTER NOTE
Rasheed Scott,    Thank you for your recent office visit.    Here are your recent results.  Per radiology- FINDINGS:     UTERUS: 8.9 x 4.4 x 6.6 cm. Normal in size and position with no masses.     ENDOMETRIUM: Intrauterine device is in appropriate position within the endometrial canal, and obscures evaluation of the endometrium. Visualized portions are normal in thickness, measuring 8 mm.     RIGHT OVARY: 2.8 x 1.8 x 2.2 cm. Normal.     LEFT OVARY: 3.3 x 1.8 x 2.7 cm. Normal.     No significant free fluid.       IMPRESSION:     1.  Appropriate positioned intrauterine device.; so I am unsure why the strings were not visible.  OB may be able to pull them out for you?  I hope you are vaginal symptoms have improved.  Keep your OB/GYN appointment if needed.    Feel free to contact me via [employer] or call the clinic at [phone].    Sincerely,    Karen Hobson, DEBBY, FNP-BC

## 2023-04-12 ENCOUNTER — OFFICE VISIT (OUTPATIENT)
Dept: OBGYN | Facility: CLINIC | Age: 33
End: 2023-04-12
Payer: COMMERCIAL

## 2023-04-12 VITALS
HEIGHT: 66 IN | SYSTOLIC BLOOD PRESSURE: 114 MMHG | WEIGHT: 142.2 LBS | BODY MASS INDEX: 22.85 KG/M2 | DIASTOLIC BLOOD PRESSURE: 69 MMHG | HEART RATE: 68 BPM | OXYGEN SATURATION: 98 %

## 2023-04-12 DIAGNOSIS — N90.89 VULVAR IRRITATION: ICD-10-CM

## 2023-04-12 DIAGNOSIS — T83.32XD INTRAUTERINE CONTRACEPTIVE DEVICE THREADS LOST, SUBSEQUENT ENCOUNTER: Primary | ICD-10-CM

## 2023-04-12 PROCEDURE — 99213 OFFICE O/P EST LOW 20 MIN: CPT | Performed by: NURSE PRACTITIONER

## 2023-04-12 RX ORDER — DOXYCYCLINE 100 MG/1
100 CAPSULE ORAL 2 TIMES DAILY
COMMUNITY
Start: 2022-12-21 | End: 2024-04-05

## 2023-04-12 NOTE — PATIENT INSTRUCTIONS
If you have any questions regarding your visit, Please contact your care team.     [facility]: [phone]  To Schedule an Appointment 24/7  Call: [phone] HOURS TELEPHONE NUMBER     Elizabeth Welch- APRN CNP      Jessica Cerda-Surgery Scheduler  Bernie-Surgery Scheduler         Monday 7:30 am-5:00 pm    Tuesday 8:00 am-4:00 pm    Wednesday 7:30 am-4:00 pm    Thursday 8:00 am-11:00 am    Friday 7:30 am-4:00 pm [place] [address] [place] [address] [phone] ask for [facility]  [phone] Fax    Imaging Scheduling all locations  [phone]    [facility] Labor and Delivery  [address] [place] [address] Dr.  [place], [place] [id number] [phone]         Urgent Care locations:  [facility]   Monday-Friday  10 am - 8 pm  Saturday and Sunday   9 am - 5 pm     [phone]   If you need a medication refill, please contact your pharmacy. Please allow 3 business days for your refill to be completed.  As always, Thank you for trusting us with your healthcare needs!      see additional instructions from your care team below

## 2023-04-13 LAB
HUMAN PAPILLOMA VIRUS 16 DNA: NEGATIVE
HUMAN PAPILLOMA VIRUS 18 DNA: NEGATIVE
HUMAN PAPILLOMA VIRUS FINAL DIAGNOSIS: NORMAL
HUMAN PAPILLOMA VIRUS OTHER HR: NEGATIVE

## 2023-06-02 ENCOUNTER — OFFICE VISIT (OUTPATIENT)
Dept: SURGERY | Facility: CLINIC | Age: 33
End: 2023-06-02
Payer: COMMERCIAL

## 2023-06-02 VITALS
WEIGHT: 142.2 LBS | BODY MASS INDEX: 22.85 KG/M2 | OXYGEN SATURATION: 99 % | DIASTOLIC BLOOD PRESSURE: 73 MMHG | HEIGHT: 66 IN | HEART RATE: 61 BPM | SYSTOLIC BLOOD PRESSURE: 119 MMHG

## 2023-06-02 DIAGNOSIS — K43.9 VENTRAL HERNIA WITHOUT OBSTRUCTION OR GANGRENE: Primary | ICD-10-CM

## 2023-06-02 PROCEDURE — 99203 OFFICE O/P NEW LOW 30 MIN: CPT | Performed by: SURGERY

## 2023-06-02 ASSESSMENT — ENCOUNTER SYMPTOMS
LEG PAIN: 0
EXERCISE INTOLERANCE: 0
PALPITATIONS: 0
PARALYSIS: 0
TREMORS: 0
ALTERED TEMPERATURE REGULATION: 0
JAUNDICE: 0
NECK PAIN: 0
MEMORY LOSS: 0
HEMATURIA: 0
FLANK PAIN: 0
TROUBLE SWALLOWING: 0
SKIN CHANGES: 0
DEPRESSION: 0
HEARTBURN: 0
DIFFICULTY URINATING: 0
WHEEZING: 0
HALLUCINATIONS: 0
DECREASED APPETITE: 0
NUMBNESS: 0
HYPOTENSION: 0
BREAST PAIN: 0
MUSCLE WEAKNESS: 0
FATIGUE: 0
ARTHRALGIAS: 0
EXTREMITY NUMBNESS: 0
DECREASED LIBIDO: 0
STIFFNESS: 0
RESPIRATORY PAIN: 0
RECTAL BLEEDING: 0
WEIGHT LOSS: 0
SINUS CONGESTION: 0
NAIL CHANGES: 0
BLOATING: 0
WEIGHT GAIN: 0
EYE WATERING: 0
DYSURIA: 0
HEMOPTYSIS: 0
POOR WOUND HEALING: 0
SINUS PAIN: 0
SMELL DISTURBANCE: 0
DECREASED CONCENTRATION: 0
HEADACHES: 0
VOMITING: 0
CONSTIPATION: 0
LEG SWELLING: 0
DISTURBANCES IN COORDINATION: 0
COUGH: 0
COUGH DISTURBING SLEEP: 0
CHILLS: 0
POLYDIPSIA: 0
DYSPNEA ON EXERTION: 0
ABDOMINAL PAIN: 0
BACK PAIN: 1
HYPERTENSION: 0
EYE REDNESS: 0
SHORTNESS OF BREATH: 0
BLOOD IN STOOL: 0
MYALGIAS: 0
POSTURAL DYSPNEA: 0
EYE IRRITATION: 0
LIGHT-HEADEDNESS: 0
BREAST MASS: 0
RECTAL PAIN: 0
SPUTUM PRODUCTION: 0
ORTHOPNEA: 0
MUSCLE CRAMPS: 0
DIZZINESS: 0
TACHYCARDIA: 0
INSOMNIA: 0
POLYPHAGIA: 0
EYE PAIN: 0
SWOLLEN GLANDS: 0
TASTE DISTURBANCE: 0
SPEECH CHANGE: 0
DIARRHEA: 0
NAUSEA: 0
SORE THROAT: 0
CLAUDICATION: 0
HOARSE VOICE: 0
SLEEP DISTURBANCES DUE TO BREATHING: 0
HOT FLASHES: 0
JOINT SWELLING: 0
SNORES LOUDLY: 0
NERVOUS/ANXIOUS: 0
INCREASED ENERGY: 0
LOSS OF CONSCIOUSNESS: 0
PANIC: 0
FEVER: 0
TINGLING: 0
WEAKNESS: 0
NIGHT SWEATS: 0
SYNCOPE: 0
SEIZURES: 0
NECK MASS: 0
BOWEL INCONTINENCE: 0
DOUBLE VISION: 0
BRUISES/BLEEDS EASILY: 0

## 2023-06-02 ASSESSMENT — PAIN SCALES - GENERAL: PAINLEVEL: NO PAIN (0)

## 2023-06-02 NOTE — LETTER
6/2/2023       RE: Sonia Candelario  [address]  [place] [address]     Dear Colleague,    Thank you for referring your patient, Sonia Candelario, to the [facility] [place] [facility]. Please see a copy of my visit note below.    New Hernia Consultation Note      Sonia Candelario  [id number]  1990    Requesting Provider: No ref. provider found    Dear Karen Hernadez,    I was asked by No ref. provider found to see this patient for the following problem: Sonia Candelario is a 32 year old female who presents to clinic today for the following health issues      CHIEF COMPLAINT:      6/2/2023    12:15 AM   Chief Complaint Reviewed With Patient   I am here today to be seen for: Umbilical Hernia         ASSESSMENT/PLAN:  ventral  Hernia size is less than 5cm in size.    Assessment & Plan   Problem List Items Addressed This Visit    None  Visit Diagnoses       Ventral hernia without obstruction or gangrene    -  Primary    Relevant Orders    PAC Visit Referral (For [facility] Only)    CT Abdomen w/o contrast    Case Request: HERNIORRHAPHY, VENTRAL, LAPAROSCOPIC (Completed)         Discussed my recommendation for lap VHR (primary). WIll MyChart her if CT changes plan. CT ordered to assess extent of associated diastasis.    She will call Pelon Cherry MD      30 minutes spent by me on the date of the encounter doing chart review, history and exam, documentation and further activities per the note    HISTORY OF PRESENT ILLNESS:  Location: upper ventral wall and midline; associated with diastasis of pregnancy  Severity: Mild         6/2/2023    12:15 AM   Timing of Hernia Reviewed With Patient   The onset of my hernia symptoms was: Gradual   My symptoms are: Intermittant (Come and Go)   My symptoms have been: Worsening           6/2/2023    12:15 AM   Duration  Reviewed With Patient   My symptoms began: 2018 6/2/2023    12:15 AM   Modifying Factor Questions Reviewed With Patient   My hernia symptoms improve with: Rest, reduction   My hernia symptoms worsen with: Lifting, exercise           6/2/2023    12:15 AM   Associated Signs and Symptoms Reviewed With Patient   I have the following complaints/concerns related to my hernia: Pain    Abdominal Bulge or Protusion    Other   Please describe any other complaints/concerns in detail: Rectus diastasis           6/2/2023    12:15 AM   [facility]   My hernia has been repaired with mesh in the past? No       Patient Supplied Answers To HerQLes Assessment Questionnaire       View : No data to display.              _______________________________________________________________________            NUTRITIONAL STATUS:  No results found for: ALBUMIN    Body mass index is 22.95 kg/m .    Patient is not immunosuppressed.    Patient is not a current smoker.    Past Medical History:   Diagnosis Date    Abnormal Pap smear of cervix 08/25/2008    LSIL    Cervical dysplasia 2010    LEEP    Eczema     Hip dysplasia     IUD (intrauterine device) in place 09/30/2021    Mirena       Patient Active Problem List   Diagnosis    CARDIOVASCULAR SCREENING; LDL GOAL LESS THAN 160    LSIL (low grade squamous intraepithelial lesion) on Pap smear    Supervision of other normal pregnancy, antepartum    Perioral dermatitis    Pruritus of genitalia    Vulvar discomfort    Intrauterine contraceptive device threads lost, initial encounter       Past Surgical History:   Procedure Laterality Date    COLPOSCOPY CERVIX, BIOPSY CERVIX, ENDOCERVICAL CURETTAGE, COMBINED  2010    moderate dysplasia    HC COLP CERVIX/UPPER VAGINA W LOOP ELEC BX CERVIX  2010    HC INSERTION INTRAUTERINE DEVICE  2011, 2016    [employer], [place]ita    HC REMOVE INTRAUTERINE DEVICE  2016, 2018    HC REPAIR OF NASAL SEPTUM  2006       MEDICATIONS:  Current Outpatient  Medications   Medication    levonorgestrel (MIRENA) 20 MCG/24HR IUD    sertraline (ZOLOFT) 50 MG tablet    triamcinolone (KENALOG) 0.1 % external ointment    doxycycline monohydrate (MONODOX) 100 MG capsule     No current facility-administered medications for this visit.       ALLERGIES:  No Known Allergies    Social History     Socioeconomic History    Marital status:      Spouse name: Narendra    Number of children: 1    Years of education: None    Highest education level: None   Occupational History    Occupation: PA in electrophysiology     Comment: [employer]   Tobacco Use    Smoking status: Never    Smokeless tobacco: Never   Vaping Use    Vaping status: Never Used   Substance and Sexual Activity    Alcohol use: Yes    Drug use: No    Sexual activity: Yes     Partners: Male     Birth control/protection: I.U.D.   Other Topics Concern    Parent/sibling w/ CABG, MI or angioplasty before 65F 55M? No   Social History Narrative           Family History   Problem Relation Age of Onset    Diabetes No family hx of     Cancer No family hx of     Thyroid Disease No family hx of     Lupus No family hx of     Thrombophilia No family hx of     C.A.D. No family hx of     Psoriasis No family hx of     Eczema No family hx of     Melanoma No family hx of        Review of Systems     Constitutional:  Negative for fever, chills, weight loss, weight gain, fatigue, decreased appetite, night sweats, recent stressors, height gain, height loss, post-operative complications, incisional pain, hallucinations, increased energy, hyperactivity and confused.   HENT:  Negative for ear pain, hearing loss, tinnitus, nosebleeds, trouble swallowing, hoarse voice, mouth sores, sore throat, ear discharge, tooth pain, gum tenderness, taste disturbance, smell disturbance, hearing aid, bleeding gums, dry mouth, sinus pain, sinus congestion and neck mass.    Eyes:  Negative for double vision, pain, redness, eye pain, decreased vision, eye  watering, eye bulging, eye dryness, flashing lights, spots, floaters, strabismus, tunnel vision, jaundice and eye irritation.   Respiratory:   Negative for cough, hemoptysis, sputum production, shortness of breath, wheezing, sleep disturbances due to breathing, snores loudly, respiratory pain, dyspnea on exertion, cough disturbing sleep and postural dyspnea.    Cardiovascular:  Negative for chest pain, dyspnea on exertion, palpitations, orthopnea, claudication, leg swelling, fingers/toes turn blue, hypertension, hypotension, syncope, history of heart murmur, chest pain on exertion, chest pain at rest, pacemaker, few scattered varicosities, leg pain, sleep disturbances due to breathing, tachycardia, light-headedness, exercise intolerance and edema.   Gastrointestinal:  Negative for heartburn, nausea, vomiting, abdominal pain, diarrhea, constipation, blood in stool, melena, rectal pain, bloating, hemorrhoids, bowel incontinence, jaundice, rectal bleeding, coffee ground emesis and change in stool.   Genitourinary:  Negative for bladder incontinence, dysuria, urgency, hematuria, flank pain, vaginal discharge, difficulty urinating, genital sores, dyspareunia, decreased libido, nocturia, voiding less frequently, arousal difficulty, abnormal vaginal bleeding, excessive menstruation, menstrual changes, hot flashes, vaginal dryness and postmenopausal bleeding.   Musculoskeletal:  Positive for back pain. Negative for myalgias, joint swelling, arthralgias, stiffness, muscle cramps, neck pain, bone pain, muscle weakness and fracture.   Skin:  Negative for nail changes, itching, poor wound healing, rash, hair changes, skin changes, acne, warts, poor wound healing, scarring, flaky skin, Raynaud's phenomenon, sensitivity to sunlight and skin thickening.   Neurological:  Negative for dizziness, tingling, tremors, speech change, seizures, loss of consciousness, weakness, light-headedness, numbness, headaches, disturbances in  "coordination, extremity numbness, memory loss, difficulty walking and paralysis.   Endo/Heme:  Negative for anemia, swollen glands and bruises/bleeds easily.   Psychiatric/Behavioral:  Negative for depression, hallucinations, memory loss, decreased concentration, mood swings and panic attacks.    Breast:  Negative for breast discharge, breast mass, breast pain and nipple retraction.   Endocrine:  Negative for altered temperature regulation, polyphagia, polydipsia, unwanted hair growth and change in facial hair.        PHYSICAL EXAM:  Objective    /73 (BP Location: Left arm, Patient Position: Sitting, Cuff Size: Adult Regular)   Pulse 61   Ht 1.676 m (5' 6\")   Wt 64.5 kg (142 lb 3.2 oz)   LMP 03/13/2023 (Exact Date)   SpO2 99%   BMI 22.95 kg/m    /73 (BP Location: Left arm, Patient Position: Sitting, Cuff Size: Adult Regular)   Pulse 61   Ht 1.676 m (5' 6\")   Wt 64.5 kg (142 lb 3.2 oz)   LMP 03/13/2023 (Exact Date)   SpO2 99%   BMI 22.95 kg/m    Body mass index is 22.95 kg/m .  Physical Exam  Musculoskeletal:         General: No edema.        Palpable small VH w diastasis        DISCUSSION OF RISKS:  The risks of hernia repair were reviewed with the patient.    These risks combine the risks of abdominal surgery and the risks of hernia repair, including mesh implantation, and were described to the patient as follows:    Abdominal surgery risks:    These include, but are not limited to, death, myocardial infarction, pneumonia, urinary tract infection, deep venous thrombosis with or without pulmonary embolus, abdominal infection from bowel injury or abscess, bowel obstruction, wound infection, and bleeding.    Hernia repair risks:    Food and Drug Administration Comments on Hernia Repair Surgery and Mesh Implantation.    http://www.fda.gov/MedicalDevices/ProductsandMedicalProcedures/ImplantsandProsthetics/HerniaSurgicalMesh/default.htm      Hernia Repair Complications    Based on FDA s analysis " of medical device adverse event reports and of peer-reviewed, scientific literature, the most common adverse events for all surgical repair of hernias--with or without mesh--are pain, infection, hernia recurrence, scar-like tissue that sticks tissues together (adhesion), blockage of the large or small intestine (obstruction), bleeding, abnormal connection between organs, vessels, or intestines (fistula), fluid build-up at the surgical site (seroma), and a hole in neighboring tissues or organs (perforation).    The most common adverse events following hernia repair with mesh are pain, infection, hernia recurrence, adhesion, and bowel obstruction. Some other potential adverse events that can occur following hernia repair with mesh are mesh migration and mesh shrinkage (contraction).    Many complications related to hernia repair with surgical mesh that have been reported to the FDA have been associated with recalled mesh products that are no longer on the market. Pain, infection, recurrence, adhesion, obstruction, and perforation are the most common complications associated with recalled mesh. In the FDA s analysis of medical adverse event reports to the FDA, recalled mesh products were the main cause of bowel perforation and obstruction complications.    Please refer to the recall notices here and here for more information if you have recalled mesh. For more information on the recalled products, please visit the FDA Medical Device Recall website. Please visit the Medical & Radiation Emitting Device Database to search a specific type of surgical mesh.    If you are unsure about the specific mesh  and brand used in your surgery and have questions about your hernia repair, contact your surgeon or the facility where your surgery was performed to obtain the information from your medical record.               Sincerely,    Tayo Cherry MD

## 2023-06-02 NOTE — NURSING NOTE
"Chief Complaint   Patient presents with     New Patient     Umbilical hernia       Vitals:    06/02/23 1156   BP: 119/73   BP Location: Left arm   Patient Position: Sitting   Cuff Size: Adult Regular   Pulse: 61   SpO2: 99%   Weight: 64.5 kg (142 lb 3.2 oz)   Height: 1.676 m (5' 6\")       Body mass index is 22.95 kg/m .                          Chris Belle, EMT    "

## 2023-06-02 NOTE — PROGRESS NOTES
Consult - [address] [place] 77 y o  male MRN: [id number]  Unit/Bed#: E4 -01 Encounter: [id number]    Assessment:  67yo male with left plantar heel pressure injury POA and right hallux lateral border ingrown nail    Plan:  · Left plantar heel wound appears noninfected, continue with local wound care to, offload with prevalon boots  · Right hallux lateral border ingrown nail, slant back with nail nipper performed  · Follow-up on left foot XR to assess bony involement    History of Present Illness   Chief Complaint: "right ingrown nail"    HPI:  Guerline Mackenzie is a 77 y o  male with PMH significant for complete heart block, sacral decubitus ulcer, muscular dystrophy presents with sepsis secondary to infected sacral decubitus ulcer  Podiatry is consulted for right big toenail pain  Patient also has a left plantar heel wound that has been present for about 4 months  He states he has been seeing Dr Angela Myers at [facility] for it  His right great toe nail has been bothering him, mostly at night  Describes pain as throbbing, moderate intensity  Denies any drainage to his big toe or previously infection  Inpatient consult to Podiatry     Performed by  Colen Gaucher, DPM     Authorized by Stu Sharma DO            Review of Systems   Constitutional: Negative  HENT: Negative  Eyes: Negative  Respiratory: Negative  Cardiovascular: Negative  Gastrointestinal: Negative  Musculoskeletal: bilateral contracture   Skin: left heel wound and right ingrown nail   Neurological: Negative  Psych: negative         Historical Information   Past Medical History:   Diagnosis Date    CHB (complete heart block) ([facility] ) 1/12/2018    Muscular dystrophy ([facility] )     Muscular dystrophy ([facility] ) 1/12/2018    Osteomyelitis ([facility] ) 1/12/2018    Syringomyelia ([facility] )      Past Surgical History:   Procedure Laterality Date    IN RECONSTRUC HIP [address] [facility] FEM HEAD Left 7/30/2020    Procedure: I & D , HIP New Hernia Consultation Note      Sonia Candelario  [id number]  1990    Requesting Provider: No ref. provider found    Dear Karen Hernadez,    I was asked by No ref. provider found to see this patient for the following problem: Sonia Candelario is a 32 year old female who presents to clinic today for the following health issues      CHIEF COMPLAINT:      6/2/2023    12:15 AM   Chief Complaint Reviewed With Patient   I am here today to be seen for: Umbilical Hernia         ASSESSMENT/PLAN:  ventral  Hernia size is less than 5cm in size.    Assessment & Plan   Problem List Items Addressed This Visit    None  Visit Diagnoses     Ventral hernia without obstruction or gangrene    -  Primary    Relevant Orders    PAC Visit Referral (For [facility] Only)    CT Abdomen w/o contrast    Case Request: HERNIORRHAPHY, VENTRAL, LAPAROSCOPIC (Completed)       Discussed my recommendation for lap VHR (primary). WIll MyChart her if CT changes plan. CT ordered to assess extent of associated diastasis.    She will call [facility] schedule    Tayo Cherry MD      30 minutes spent by me on the date of the encounter doing chart review, history and exam, documentation and further activities per the note    HISTORY OF PRESENT ILLNESS:  Location: upper ventral wall and midline; associated with diastasis of pregnancy  Severity: Mild         6/2/2023    12:15 AM   Timing of Hernia Reviewed With Patient   The onset of my hernia symptoms was: Gradual   My symptoms are: Intermittant (Come and Go)   My symptoms have been: Worsening           6/2/2023    12:15 AM   Duration Reviewed With Patient   My symptoms began: 2018           6/2/2023    12:15 AM   Modifying Factor Questions Reviewed With Patient   My hernia symptoms improve with: Rest, reduction   My hernia symptoms worsen with: Lifting, exercise           6/2/2023    12:15 AM   Associated Signs and Symptoms Reviewed With Patient   I have the following  GIRDLESTONE PROCEDURE;  Surgeon: Dora Wilcox MD;  Location: [facility] OR;  Service: Orthopedics     Social History   Social History     Substance and Sexual Activity   Alcohol Use No    Frequency: Never    Binge frequency: Never    Comment: no alcohol consumption     Social History     Substance and Sexual Activity   Drug Use No     Social History     Tobacco Use   Smoking Status Former Smoker   Smokeless Tobacco Never Used     Family History: History reviewed  No pertinent family history  Meds/Allergies   Medications Prior to Admission   Medication    aspirin 81 MG tablet     Allergies   Allergen Reactions    Penicillins     Shellfish-Derived Products Swelling    Sulfa Antibiotics        Objective   First Vitals:   Blood Pressure: 149/70 (07/24/20 1124)  Pulse: (!) 46 (07/24/20 1124)  Temperature: 99 8 °F (37 7 °C) (07/24/20 1124)  Temp Source: Oral (07/24/20 1124)  Respirations: 16 (07/24/20 1124)  Height: 6'(per records) (07/26/20 0804)  Weight - Scale: 50 kg (110 lb 3 7 oz) (07/24/20 1600)  SpO2: 97 % (07/24/20 1124)    Current Vitals:   Blood Pressure: 141/60 (08/03/20 0700)  Pulse: (!) 45 (08/03/20 0700)  Temperature: 98 °F (36 7 °C) (08/03/20 0700)  Temp Source: Tympanic (08/03/20 0700)  Respirations: 22 (08/03/20 0700)  Height: 6' (07/26/20 0805)  Weight - Scale: 50 kg (110 lb 3 7 oz) (07/24/20 1600)  SpO2: 97 % (08/03/20 0700)    /60 (BP Location: Left arm)   Pulse (!) 45   Temp 98 °F (36 7 °C) (Tympanic)   Resp 22   Ht 6'   Wt 50 kg (110 lb 3 7 oz)   SpO2 97%   BMI 14 95 kg/m²      Physical Exam    General Appearance:    Alert, cooperative, no distress   Head:    Normocephalic, without obvious abnormality, atraumatic   Neck:   Supple, symmetrical, trachea midline   Lungs:     Respirations unlabored, no audible wheezes   Abdomen:     Soft, non-tender   Lower Extremities:    Vascular:   Right DP pulse is 2/4, Right PT pulse is 2/4, Left DP pulse is 2/4, Left PT pulse is 1/4   CRT < 3 complaints/concerns related to my hernia: Pain    Abdominal Bulge or Protusion    Other   Please describe any other complaints/concerns in detail: Rectus diastasis           6/2/2023    12:15 AM   [facility] HISTORY   My hernia has been repaired with mesh in the past? No       Patient Supplied Answers To HerQLes Assessment Questionnaire       View : No data to display.              _______________________________________________________________________            NUTRITIONAL STATUS:  No results found for: ALBUMIN    Body mass index is 22.95 kg/m .    Patient is not immunosuppressed.    Patient is not a current smoker.    Past Medical History:   Diagnosis Date     Abnormal Pap smear of cervix 08/25/2008    LSIL     Cervical dysplasia 2010    LEEP     Eczema      Hip dysplasia      IUD (intrauterine device) in place 09/30/2021    Mirena       Patient Active Problem List   Diagnosis     CARDIOVASCULAR SCREENING; LDL GOAL LESS THAN 160     LSIL (low grade squamous intraepithelial lesion) on Pap smear     Supervision of other normal pregnancy, antepartum     Perioral dermatitis     Pruritus of genitalia     Vulvar discomfort     Intrauterine contraceptive device threads lost, initial encounter       Past Surgical History:   Procedure Laterality Date     COLPOSCOPY CERVIX, BIOPSY CERVIX, ENDOCERVICAL CURETTAGE, COMBINED  2010    moderate dysplasia     HC COLP CERVIX/UPPER VAGINA W LOOP ELEC BX CERVIX  2010     HC INSERTION INTRAUTERINE DEVICE  2011, 2016    [employer], Estrellita     HC REMOVE INTRAUTERINE DEVICE  2016, 2018     HC REPAIR OF NASAL SEPTUM  2006       MEDICATIONS:  Current Outpatient Medications   Medication     levonorgestrel (MIRENA) 20 MCG/24HR IUD     sertraline (ZOLOFT) 50 MG tablet     triamcinolone (KENALOG) 0.1 % external ointment     doxycycline monohydrate (MONODOX) 100 MG capsule     No current facility-administered medications for this visit.       ALLERGIES:  No Known Allergies    Social  History     Socioeconomic History     Marital status:      Spouse name: Narendra     Number of children: 1     Years of education: None     Highest education level: None   Occupational History     Occupation: PA in electrophysiology     Comment: [employer]   Tobacco Use     Smoking status: Never     Smokeless tobacco: Never   Vaping Use     Vaping status: Never Used   Substance and Sexual Activity     Alcohol use: Yes     Drug use: No     Sexual activity: Yes     Partners: Male     Birth control/protection: I.U.D.   Other Topics Concern     Parent/sibling w/ CABG, MI or angioplasty before 65F 55M? No   Social History Narrative           Family History   Problem Relation Age of Onset     Diabetes No family hx of      Cancer No family hx of      Thyroid Disease No family hx of      Lupus No family hx of      Thrombophilia No family hx of      C.A.D. No family hx of      Psoriasis No family hx of      Eczema No family hx of      Melanoma No family hx of        Review of Systems     Constitutional:  Negative for fever, chills, weight loss, weight gain, fatigue, decreased appetite, night sweats, recent stressors, height gain, height loss, post-operative complications, incisional pain, hallucinations, increased energy, hyperactivity and confused.   HENT:  Negative for ear pain, hearing loss, tinnitus, nosebleeds, trouble swallowing, hoarse voice, mouth sores, sore throat, ear discharge, tooth pain, gum tenderness, taste disturbance, smell disturbance, hearing aid, bleeding gums, dry mouth, sinus pain, sinus congestion and neck mass.    Eyes:  Negative for double vision, pain, redness, eye pain, decreased vision, eye watering, eye bulging, eye dryness, flashing lights, spots, floaters, strabismus, tunnel vision, jaundice and eye irritation.   Respiratory:   Negative for cough, hemoptysis, sputum production, shortness of breath, wheezing, sleep disturbances due to breathing, snores loudly, respiratory pain,  seconds at the digits  Pedal hair is absent  No edema noted at bilateral lower extremities  Skin temperature is normal bilaterally  Musculoskeletal:  MMT is 1/5 in all muscle compartments bilaterally  Passive ROM at the 1st MPJ and ankle joint are normal bilaterally  Bilateral lower extremity flexion contractures noted    Dermatological:  Skin is dry and atrophic, small superficial abrasions noted to anterior medial lower leg, right lateral hallux nail border with subungual debris and thickening    Neurological:  Gross sensation is intact  Wound #: 1  Location: left plantar heel   Length 3 5cm: Width 2 5cm: Depth 0 2cm:   Wound type: Pressure  Signs of Infection: No  Deepest Tissue Noted in Base: Fat/adipose  Probe to Bone: No  Tunneling: No  Undermining: No  Sinus Tract: No  Peripheral Skin Description: non-attached  Exudate amount: moderate  Exudate Type:  Serous exudate  Exudate Color: Clear  Wound base: pink/red, granular and eschar  Granulation: 90% Fibrotic Tissue: 0% Necrotic Tissue: 10%   No debridement performed at this time        Lab Results:   No results displayed because visit has over 200 results  Results from last 7 days   Lab Units 07/30/20  1403   GRAM STAIN RESULT  2+ Polys*  3+ Gram positive cocci in pairs*             Results from last 7 days   Lab Units 07/30/20  1403   ANAEROBIC CULTURE  2+ Growth of Bacteroides ovatus group*  2+ Growth of Bacteroides uniformis*         Imaging: I have personally reviewed pertinent films in PACS  EKG, Pathology, and Other Studies: I have personally reviewed pertinent reports  VTE Pharmacologic Prophylaxis: Enoxaparin (Lovenox)  Code Status: Level 3 - DNAR and DNI      Portions of the record may have been created with voice recognition software  Occasional wrong word or "sound a like" substitutions may have occurred due to the inherent limitations of voice recognition software   Read the chart carefully and recognize, using context, where dyspnea on exertion, cough disturbing sleep and postural dyspnea.    Cardiovascular:  Negative for chest pain, dyspnea on exertion, palpitations, orthopnea, claudication, leg swelling, fingers/toes turn blue, hypertension, hypotension, syncope, history of heart murmur, chest pain on exertion, chest pain at rest, pacemaker, few scattered varicosities, leg pain, sleep disturbances due to breathing, tachycardia, light-headedness, exercise intolerance and edema.   Gastrointestinal:  Negative for heartburn, nausea, vomiting, abdominal pain, diarrhea, constipation, blood in stool, melena, rectal pain, bloating, hemorrhoids, bowel incontinence, jaundice, rectal bleeding, coffee ground emesis and change in stool.   Genitourinary:  Negative for bladder incontinence, dysuria, urgency, hematuria, flank pain, vaginal discharge, difficulty urinating, genital sores, dyspareunia, decreased libido, nocturia, voiding less frequently, arousal difficulty, abnormal vaginal bleeding, excessive menstruation, menstrual changes, hot flashes, vaginal dryness and postmenopausal bleeding.   Musculoskeletal:  Positive for back pain. Negative for myalgias, joint swelling, arthralgias, stiffness, muscle cramps, neck pain, bone pain, muscle weakness and fracture.   Skin:  Negative for nail changes, itching, poor wound healing, rash, hair changes, skin changes, acne, warts, poor wound healing, scarring, flaky skin, Raynaud's phenomenon, sensitivity to sunlight and skin thickening.   Neurological:  Negative for dizziness, tingling, tremors, speech change, seizures, loss of consciousness, weakness, light-headedness, numbness, headaches, disturbances in coordination, extremity numbness, memory loss, difficulty walking and paralysis.   Endo/Heme:  Negative for anemia, swollen glands and bruises/bleeds easily.   Psychiatric/Behavioral:  Negative for depression, hallucinations, memory loss, decreased concentration, mood swings and panic attacks.   substitutions have occurred      Jairo Blevins DPM "  Breast:  Negative for breast discharge, breast mass, breast pain and nipple retraction.   Endocrine:  Negative for altered temperature regulation, polyphagia, polydipsia, unwanted hair growth and change in facial hair.        PHYSICAL EXAM:  Objective    /73 (BP Location: Left arm, Patient Position: Sitting, Cuff Size: Adult Regular)   Pulse 61   Ht 1.676 m (5' 6\")   Wt 64.5 kg (142 lb 3.2 oz)   LMP 03/13/2023 (Exact Date)   SpO2 99%   BMI 22.95 kg/m    /73 (BP Location: Left arm, Patient Position: Sitting, Cuff Size: Adult Regular)   Pulse 61   Ht 1.676 m (5' 6\")   Wt 64.5 kg (142 lb 3.2 oz)   LMP 03/13/2023 (Exact Date)   SpO2 99%   BMI 22.95 kg/m    Body mass index is 22.95 kg/m .  Physical Exam  Musculoskeletal:         General: No edema.        Palpable small VH w diastasis        DISCUSSION OF RISKS:  The risks of hernia repair were reviewed with the patient.    These risks combine the risks of abdominal surgery and the risks of hernia repair, including mesh implantation, and were described to the patient as follows:    Abdominal surgery risks:    These include, but are not limited to, death, myocardial infarction, pneumonia, urinary tract infection, deep venous thrombosis with or without pulmonary embolus, abdominal infection from bowel injury or abscess, bowel obstruction, wound infection, and bleeding.    Hernia repair risks:    Food and Drug Administration Comments on Hernia Repair Surgery and Mesh Implantation.    http://www.fda.gov/MedicalDevices/ProductsandMedicalProcedures/ImplantsandProsthetics/HerniaSurgicalMesh/default.htm      Hernia Repair Complications    Based on FDA s analysis of medical device adverse event reports and of peer-reviewed, scientific literature, the most common adverse events for all surgical repair of hernias--with or without mesh--are pain, infection, hernia recurrence, scar-like tissue that sticks tissues together (adhesion), blockage of the large or " small intestine (obstruction), bleeding, abnormal connection between organs, vessels, or intestines (fistula), fluid build-up at the surgical site (seroma), and a hole in neighboring tissues or organs (perforation).    The most common adverse events following hernia repair with mesh are pain, infection, hernia recurrence, adhesion, and bowel obstruction. Some other potential adverse events that can occur following hernia repair with mesh are mesh migration and mesh shrinkage (contraction).    Many complications related to hernia repair with surgical mesh that have been reported to the FDA have been associated with recalled mesh products that are no longer on the market. Pain, infection, recurrence, adhesion, obstruction, and perforation are the most common complications associated with recalled mesh. In the FDA s analysis of medical adverse event reports to the FDA, recalled mesh products were the main cause of bowel perforation and obstruction complications.    Please refer to the recall notices here and here for more information if you have recalled mesh. For more information on the recalled products, please visit the FDA Medical Device Recall website. Please visit the Medical & Radiation Emitting Device Database to search a specific type of surgical mesh.    If you are unsure about the specific mesh  and brand used in your surgery and have questions about your hernia repair, contact your surgeon or the facility where your surgery was performed to obtain the information from your medical record.               Sincerely,    Tayo Cherry MD

## 2023-06-02 NOTE — PATIENT INSTRUCTIONS
You saw Dr Cherry and were diagnosed with a small midline ventral hernia, which can be repaired laparoscopically. To schedule surgery and a pre-anesthesia clinic appointment, please call [facility] at [phone]. If the plan changes as a result of the CT scan, Dr Cherry will contact you.

## 2023-06-05 ENCOUNTER — HOSPITAL ENCOUNTER (OUTPATIENT)
Dept: CT IMAGING | Facility: CLINIC | Age: 33
Discharge: HOME | End: 2023-06-05
Attending: SURGERY | Admitting: SURGERY
Payer: COMMERCIAL

## 2023-06-05 DIAGNOSIS — K43.9 VENTRAL HERNIA WITHOUT OBSTRUCTION OR GANGRENE: ICD-10-CM

## 2023-06-05 PROCEDURE — 74176 CT ABD & PELVIS W/O CONTRAST: CPT

## 2023-06-05 PROCEDURE — 74176 CT ABD & PELVIS W/O CONTRAST: CPT | Mod: 26 | Performed by: STUDENT IN AN ORGANIZED HEALTH CARE EDUCATION/TRAINING PROGRAM

## 2023-06-12 ENCOUNTER — PATIENT OUTREACH (OUTPATIENT)
Dept: CARE COORDINATION | Facility: CLINIC | Age: 33
End: 2023-06-12
Payer: COMMERCIAL

## 2023-07-25 ENCOUNTER — OFFICE VISIT (OUTPATIENT)
Dept: PLASTIC SURGERY | Facility: CLINIC | Age: 33
End: 2023-07-25
Attending: PLASTIC SURGERY
Payer: COMMERCIAL

## 2023-07-25 VITALS
WEIGHT: 144 LBS | SYSTOLIC BLOOD PRESSURE: 109 MMHG | TEMPERATURE: 97.9 F | HEART RATE: 66 BPM | DIASTOLIC BLOOD PRESSURE: 69 MMHG | RESPIRATION RATE: 18 BRPM | BODY MASS INDEX: 23.14 KG/M2 | HEIGHT: 66 IN | OXYGEN SATURATION: 100 %

## 2023-07-25 DIAGNOSIS — M62.08 DIASTASIS RECTI: Primary | ICD-10-CM

## 2023-07-25 PROCEDURE — 99205 OFFICE O/P NEW HI 60 MIN: CPT | Performed by: PLASTIC SURGERY

## 2023-07-25 PROCEDURE — G0463 HOSPITAL OUTPT CLINIC VISIT: HCPCS | Performed by: PLASTIC SURGERY

## 2023-07-25 ASSESSMENT — PAIN SCALES - GENERAL: PAINLEVEL: NO PAIN (0)

## 2023-07-25 NOTE — PROGRESS NOTES
CONSULT NOTE    REFERRING PROVIDER:  Karen Hernadez MD    PRESENTING COMPLAINT:  Consultation for abdominal contouring surgery.      HISTORY OF PRESENTING COMPLAINT:  Ms. Candelario is 32 years old. After having 2 children, the last one being in 2021, she has noticed a significant rectus diastasis along with some bulging of the abdominal wall.  She was seen by General Surgery who on a CAT scan found her to have a less than 1 cm umbilical hernia with fat interposition but otherwise about a 3-4 cm rectus diastasis and was sent to me for recommendations.  The patient is here to discuss that with me.  She also has some discomfort in the mid portion of the abdominal wall when she is very active and has some low back pain.    PAST MEDICAL HISTORY:  Depression.    PAST SURGICAL HISTORY:  No surgery.    MEDICATIONS:  Sertraline.    ALLERGIES:  Nil.    SOCIAL HISTORY:  Does not smoke, socially drinks. Lives in [place].  She is a PA in hematology.    REVIEW OF SYSTEMS:  Denies chest pain, shortness of breath, MI, CVA, DVT and PE.      PHYSICAL EXAMINATION:  Patient is afebrile, in no obvious distress.  She is 5 feet, 6-1/2 inches, 144 pounds, BMI 23 kg/m2.  On examination of her abdomen, she has very little extra abdominal wall skin.  Skin pinch thickness is under 1.5 cm. She has slight poochiness of abdominal wall, especially in the lower abdomen.  Her umbilicus is widened.  Has a less than 1     cm small, la umbilical hernia.  The rest of the umbilicus is stretched out.  She has a rectus diastasis of about 3 cm.  On tightening the abdominal wall, her abdomen is strong.  She flattens her abdominal wall.  She has very little excess adiposity in the periumbilical region.    ASSESSMENT AND PLAN:  Based on above findings, a diagnosis of abdominal wall weakness, bulge and rectus diastasis was made.  I had a la, detailed discuss with the patient and her  in the presence of my nurse, Jessica Luther, who  was present from beginning to end.  Discussed with the patient that we have a number of variables at hand that she would like addressed. First is the abdominal wall discomfort and back pain, second is the rectus diastasis, third is the umbilical hernia/umbilical widening and lastly is the bulge of her abdominal wall.  What is not the issue is the adiposity or excess skin of abdominal wall.  I had a la conversation about what an abdominoplasty would entail and also discussed with her umbilical hernia repairs, either currently or simultaneously.  Explained the difference between umbilical hernia and paraumbilical hernia.  In her case, it is a very small umbilical hernia and I do not know if it even needs to be fixed and that is a discussion between the patient and General Surgery.  If it is fixed simultaneously, there is a high risk of umbilical necrosis given the blood flow through the stalk.  If no umbilical hernia is fixed at the same time, an abdominoplasty would result in a scar in the lower portion of the abdomen along with an umbilicoplasty that I would try to make narrower and smaller.  Discussed with the patient the concept of abdominoplasty, where the scar would be and what we would try to do in terms of the abdominal wall plication and umbilical re-setting.  If we did go through the abdominoplasty we would not be able to remove the umbilical opening, and therefore, she would have a small vertical incision.  These concepts were explained in detail.  We discussed all of the expectations of surgery and the fact that pain is not one of the expectations we would try and fulfill with the surgery.   I cannot guarantee that would improve.  Overall, all risks, benefits, and alternatives of the abdominoplasty, including pain, infection, bleeding, scarring, asymmetries, seromas, hematomas, wound breakdown, wound dehiscence, numbness, umbilical necrosis, skin necrosis, inability to remove all the excess tissue,  reloosening over time, DVT, PE, MI, CVA, pneumonia, renal failure and death were explained.  We would give her preoperative Lovenox and told her of the importance of ambulation thereafter.  All questions were answered.  She was happy with the visit.  We gave her quotes for the procedure.    Total time spent in the encounter today, including chart review, visit itself and post-visit paperwork, was 60 minutes.    DORA Fu MD    cc:  Karen Hernadez MD  [facility]  [address] [place]  [address]

## 2023-07-25 NOTE — NURSING NOTE
"Oncology Rooming Note    July 25, 2023 8:50 AM   Sonia Garcia Tobingilbertocorine Candelario is a 32 year old female who presents for:    Chief Complaint   Patient presents with    Oncology Clinic Visit     Diastasis Recti     Initial Vitals: /69 (BP Location: Right arm, Patient Position: Sitting, Cuff Size: Adult Regular)   Pulse 66   Temp 97.9  F (36.6  C) (Oral)   Resp 18   Ht 1.689 m (5' 6.5\")   Wt 65.3 kg (144 lb)   LMP 06/24/2023   SpO2 100%   BMI 22.90 kg/m   Estimated body mass index is 22.9 kg/m  as calculated from the following:    Height as of this encounter: 1.689 m (5' 6.5\").    Weight as of this encounter: 65.3 kg (144 lb). Body surface area is 1.75 meters squared.  No Pain (0) Comment: Data Unavailable   Patient's last menstrual period was 06/24/2023.  Allergies reviewed: Yes  Medications reviewed: Yes    Medications: Medication refills not needed today.  Pharmacy name entered into [facility]:    [id number] - [facility] [address] [facility]    Clinical concerns: New patient consult for Diastasis.        Che Luther              "

## 2023-07-25 NOTE — LETTER
7/25/2023         RE: Sonia Candelario  [address]  [facility] [address]        Dear Colleague,    Thank you for referring your patient, Sonia Candelario, to the [facility]. Please see a copy of my visit note below.    CONSULT NOTE    REFERRING PROVIDER:  Karen Hernadez MD    PRESENTING COMPLAINT:  Consultation for abdominal contouring surgery.      HISTORY OF PRESENTING COMPLAINT:  Ms. Candelario is 32 years old. After having 2 children, the last one being in 2021, she has noticed a significant rectus diastasis along with some bulging of the abdominal wall.  She was seen by General Surgery who on a CAT scan found her to have a less than 1 cm umbilical hernia with fat interposition but otherwise about a 3-4 cm rectus diastasis and was sent to me for recommendations.  The patient is here to discuss that with me.  She also has some discomfort in the mid portion of the abdominal wall when she is very active and has some low back pain.    PAST MEDICAL HISTORY:  Depression.    PAST SURGICAL HISTORY:  No surgery.    MEDICATIONS:  Sertraline.    ALLERGIES:  Nil.    SOCIAL HISTORY:  Does not smoke, socially drinks. Lives in [place].  She is a PA in hematology.    REVIEW OF SYSTEMS:  Denies chest pain, shortness of breath, MI, CVA, DVT and PE.      PHYSICAL EXAMINATION:  Patient is afebrile, in no obvious distress.  She is 5 feet, 6-1/2 inches, 144 pounds, BMI 23 kg/m2.  On examination of her abdomen, she has very little extra abdominal wall skin.  Skin pinch thickness is under 1.5 cm. She has slight poochiness of abdominal wall, especially in the lower abdomen.  Her umbilicus is widened.  Has a less than 1     cm small, la umbilical hernia.  The rest of the umbilicus is stretched out.  She has a rectus diastasis of about 3 cm.  On tightening the abdominal wall, her abdomen is strong.  She flattens her abdominal wall.  She has very little excess  adiposity in the periumbilical region.    ASSESSMENT AND PLAN:  Based on above findings, a diagnosis of abdominal wall weakness, bulge and rectus diastasis was made.  I had a la, detailed discuss with the patient and her  in the presence of my nurse, Jessica Luther, who was present from beginning to end.  Discussed with the patient that we have a number of variables at hand that she would like addressed. First is the abdominal wall discomfort and back pain, second is the rectus diastasis, third is the umbilical hernia/umbilical widening and lastly is the bulge of her abdominal wall.  What is not the issue is the adiposity or excess skin of abdominal wall.  I had a la conversation about what an abdominoplasty would entail and also discussed with her umbilical hernia repairs, either currently or simultaneously.  Explained the difference between umbilical hernia and paraumbilical hernia.  In her case, it is a very small umbilical hernia and I do not know if it even needs to be fixed and that is a discussion between the patient and General Surgery.  If it is fixed simultaneously, there is a high risk of umbilical necrosis given the blood flow through the stalk.  If no umbilical hernia is fixed at the same time, an abdominoplasty would result in a scar in the lower portion of the abdomen along with an umbilicoplasty that I would try to make narrower and smaller.  Discussed with the patient the concept of abdominoplasty, where the scar would be and what we would try to do in terms of the abdominal wall plication and umbilical re-setting.  If we did go through the abdominoplasty we would not be able to remove the umbilical opening, and therefore, she would have a small vertical incision.  These concepts were explained in detail.  We discussed all of the expectations of surgery and the fact that pain is not one of the expectations we would try and fulfill with the surgery.   I cannot guarantee that would  improve.  Overall, all risks, benefits, and alternatives of the abdominoplasty, including pain, infection, bleeding, scarring, asymmetries, seromas, hematomas, wound breakdown, wound dehiscence, numbness, umbilical necrosis, skin necrosis, inability to remove all the excess tissue, reloosening over time, DVT, PE, MI, CVA, pneumonia, renal failure and death were explained.  We would give her preoperative Lovenox and told her of the importance of ambulation thereafter.  All questions were answered.  She was happy with the visit.  We gave her quotes for the procedure.    Total time spent in the encounter today, including chart review, visit itself and post-visit paperwork, was 60 minutes.    DORA Fu MD    cc:  Karen Hernadez MD  [facility]  [address]  [address]

## 2023-08-04 ENCOUNTER — OFFICE VISIT (OUTPATIENT)
Dept: FAMILY MEDICINE | Facility: CLINIC | Age: 33
End: 2023-08-04
Payer: COMMERCIAL

## 2023-08-04 VITALS
BODY MASS INDEX: 22.29 KG/M2 | HEART RATE: 79 BPM | RESPIRATION RATE: 18 BRPM | OXYGEN SATURATION: 97 % | WEIGHT: 142 LBS | SYSTOLIC BLOOD PRESSURE: 100 MMHG | HEIGHT: 67 IN | DIASTOLIC BLOOD PRESSURE: 68 MMHG | TEMPERATURE: 97.4 F

## 2023-08-04 DIAGNOSIS — Z00.00 ROUTINE GENERAL MEDICAL EXAMINATION AT A HEALTH CARE FACILITY: Primary | ICD-10-CM

## 2023-08-04 DIAGNOSIS — Z13.220 SCREENING CHOLESTEROL LEVEL: ICD-10-CM

## 2023-08-04 DIAGNOSIS — Z13.29 SCREENING FOR THYROID DISORDER: ICD-10-CM

## 2023-08-04 DIAGNOSIS — Z13.1 SCREENING FOR DIABETES MELLITUS: ICD-10-CM

## 2023-08-04 DIAGNOSIS — Z11.59 NEED FOR HEPATITIS C SCREENING TEST: ICD-10-CM

## 2023-08-04 DIAGNOSIS — L71.0 PERIORAL DERMATITIS: ICD-10-CM

## 2023-08-04 LAB
ALBUMIN SERPL BCG-MCNC: 4.9 G/DL (ref 3.5–5.2)
ALP SERPL-CCNC: 60 U/L (ref 35–104)
ALT SERPL W P-5'-P-CCNC: 7 U/L (ref 0–50)
ANION GAP SERPL CALCULATED.3IONS-SCNC: 10 MMOL/L (ref 7–15)
AST SERPL W P-5'-P-CCNC: 23 U/L (ref 0–45)
BILIRUB SERPL-MCNC: 0.4 MG/DL
BUN SERPL-MCNC: 13.1 MG/DL (ref 6–20)
CALCIUM SERPL-MCNC: 9.4 MG/DL (ref 8.6–10)
CHLORIDE SERPL-SCNC: 103 MMOL/L (ref 98–107)
CHOLEST SERPL-MCNC: 142 MG/DL
CREAT SERPL-MCNC: 0.87 MG/DL (ref 0.51–0.95)
DEPRECATED HCO3 PLAS-SCNC: 27 MMOL/L (ref 22–29)
GFR SERPL CREATININE-BSD FRML MDRD: 89 ML/MIN/1.73M2
GLUCOSE SERPL-MCNC: 93 MG/DL (ref 70–99)
HCV AB SERPL QL IA: NONREACTIVE
HDLC SERPL-MCNC: 81 MG/DL
LDLC SERPL CALC-MCNC: 53 MG/DL
NONHDLC SERPL-MCNC: 61 MG/DL
POTASSIUM SERPL-SCNC: 4.5 MMOL/L (ref 3.4–5.3)
PROT SERPL-MCNC: 7.4 G/DL (ref 6.4–8.3)
SODIUM SERPL-SCNC: 140 MMOL/L (ref 136–145)
TRIGL SERPL-MCNC: 41 MG/DL
TSH SERPL DL<=0.005 MIU/L-ACNC: 1.55 UIU/ML (ref 0.3–4.2)

## 2023-08-04 PROCEDURE — 36415 COLL VENOUS BLD VENIPUNCTURE: CPT | Performed by: NURSE PRACTITIONER

## 2023-08-04 PROCEDURE — 86803 HEPATITIS C AB TEST: CPT | Performed by: NURSE PRACTITIONER

## 2023-08-04 PROCEDURE — 80061 LIPID PANEL: CPT | Performed by: NURSE PRACTITIONER

## 2023-08-04 PROCEDURE — 84443 ASSAY THYROID STIM HORMONE: CPT | Performed by: NURSE PRACTITIONER

## 2023-08-04 PROCEDURE — 80053 COMPREHEN METABOLIC PANEL: CPT | Performed by: NURSE PRACTITIONER

## 2023-08-04 PROCEDURE — 99395 PREV VISIT EST AGE 18-39: CPT | Performed by: NURSE PRACTITIONER

## 2023-08-04 PROCEDURE — 99213 OFFICE O/P EST LOW 20 MIN: CPT | Mod: 25 | Performed by: NURSE PRACTITIONER

## 2023-08-04 RX ORDER — SPIRONOLACTONE 25 MG/1
25 TABLET ORAL 2 TIMES DAILY
Qty: 180 TABLET | Refills: 3 | Status: SHIPPED | OUTPATIENT
Start: 2023-08-04 | End: 2024-07-11

## 2023-08-04 ASSESSMENT — ENCOUNTER SYMPTOMS
DIZZINESS: 0
FEVER: 0
SHORTNESS OF BREATH: 0
CONSTIPATION: 0
HEMATOCHEZIA: 0
JOINT SWELLING: 0
FREQUENCY: 0
CHILLS: 0
ARTHRALGIAS: 0
HEADACHES: 0
SORE THROAT: 0
PARESTHESIAS: 0
HEMATURIA: 0
NERVOUS/ANXIOUS: 0
WEAKNESS: 0
MYALGIAS: 1
EYE PAIN: 0
COUGH: 0
HEARTBURN: 0
NAUSEA: 0
BREAST MASS: 0
DYSURIA: 0
DIARRHEA: 0
ABDOMINAL PAIN: 0
PALPITATIONS: 0

## 2023-08-04 NOTE — PATIENT INSTRUCTIONS
If you get irregular periods after removal of your IUD; could be related to removal but I have also seen it with aldactone- just fyi.  Speak up if you have any concerns.  Preventive Health Recommendations  Female Ages 26 - 39  Yearly exam:   See your health care provider every year in order to  Review health changes.   Discuss preventive care.    Review your medicines if you your doctor has prescribed any.    Until age 30: Get a Pap test every three years (more often if you have had an abnormal result).    After age 30: Talk to your doctor about whether you should have a Pap test every 3 years or have a Pap test with HPV screening every 5 years.   You do not need a Pap test if your uterus was removed (hysterectomy) and you have not had cancer.  You should be tested each year for STDs (sexually transmitted diseases), if you're at risk.   Talk to your provider about how often to have your cholesterol checked.  If you are at risk for diabetes, you should have a diabetes test (fasting glucose).  Shots: Get a flu shot each year. Get a tetanus shot every 10 years.   Nutrition:   Eat at least 5 servings of fruits and vegetables each day.  Eat whole-grain bread, whole-wheat pasta and brown rice instead of white grains and rice.  Get adequate Calcium and Vitamin D.     Lifestyle  Exercise at least 150 minutes a week (30 minutes a day, 5 days of the week). This will help you control your weight and prevent disease.  Limit alcohol to one drink per day.  No smoking.   Wear sunscreen to prevent skin cancer.  See your dentist every six months for an exam and cleaning.

## 2023-08-04 NOTE — PROGRESS NOTES
SUBJECTIVE:   CC: Sonia is an 32 year old who presents for preventive health visit.       8/4/2023     8:40 AM   Additional Questions   Roomed by Lesly   Accompanied by kamla         8/4/2023     8:40 AM   Patient Reported Additional Medications   Patient reports taking the following new medications none       Healthy Habits:     Getting at least 3 servings of Calcium per day:  Yes    Bi-annual eye exam:  NO    Dental care twice a year:  Yes    Sleep apnea or symptoms of sleep apnea:  None    Diet:  Regular (no restrictions)    Frequency of exercise:  4-5 days/week    Duration of exercise:  15-30 minutes    Taking medications regularly:  Yes    Medication side effects:  Other    Additional concerns today:  Yes      Work is going well, mental health is doing well on zoloft, plans to wean off zoloft at this time. Enjoying summer with her kids.         Has perioral dermatitis, has tried antifungal, steroid, OTC without effectiveness; doxycycline helps but is hard on stomach. Wondering about trying aldactone. Has dermatology visit in the fall.         Social History     Tobacco Use    Smoking status: Never     Passive exposure: Never    Smokeless tobacco: Never   Substance Use Topics    Alcohol use: Yes             8/4/2023     8:32 AM   Alcohol Use   Prescreen: >3 drinks/day or >7 drinks/week? No     Reviewed orders with patient.  Reviewed health maintenance and updated orders accordingly - Yes  Lab work is in process  Labs reviewed in EPIC  BP Readings from Last 3 Encounters:   08/04/23 100/68   07/25/23 109/69   06/02/23 119/73    Wt Readings from Last 3 Encounters:   08/04/23 64.4 kg (142 lb)   07/25/23 65.3 kg (144 lb)   06/02/23 64.5 kg (142 lb 3.2 oz)                  Patient Active Problem List   Diagnosis    CARDIOVASCULAR SCREENING; LDL GOAL LESS THAN 160    LSIL (low grade squamous intraepithelial lesion) on Pap smear    Supervision of other normal pregnancy, antepartum    Perioral dermatitis    Pruritus of  genitalia    Vulvar discomfort    Intrauterine contraceptive device threads lost, initial encounter     Past Surgical History:   Procedure Laterality Date    COLPOSCOPY CERVIX, BIOPSY CERVIX, ENDOCERVICAL CURETTAGE, COMBINED  2010    moderate dysplasia    HC COLP CERVIX/UPPER VAGINA W LOOP ELEC BX CERVIX  2010    HC INSERTION INTRAUTERINE DEVICE  2011, 2016    ParaGard, Estrellita    HC REMOVE INTRAUTERINE DEVICE  2016, 2018    HC REPAIR OF NASAL SEPTUM  2006       Social History     Tobacco Use    Smoking status: Never     Passive exposure: Never    Smokeless tobacco: Never   Substance Use Topics    Alcohol use: Yes     Family History   Problem Relation Age of Onset    Diabetes No family hx of     Cancer No family hx of     Thyroid Disease No family hx of     Lupus No family hx of     Thrombophilia No family hx of     C.A.D. No family hx of     Psoriasis No family hx of     Eczema No family hx of     Melanoma No family hx of          Current Outpatient Medications   Medication Sig Dispense Refill    doxycycline monohydrate (MONODOX) 100 MG capsule Take 100 mg by mouth 2 times daily      levonorgestrel (MIRENA) 20 MCG/24HR IUD 1 each (20 mcg) by Intrauterine route once      sertraline (ZOLOFT) 50 MG tablet Take 1 tablet (50 mg) by mouth daily 90 tablet 3    spironolactone (ALDACTONE) 25 MG tablet Take 1 tablet (25 mg) by mouth 2 times daily 180 tablet 3     No Known Allergies  Recent Labs   Lab Test 09/30/21  1003   LDL 64   HDL 81   TRIG 27   CR 0.97   GFRESTIMATED 78   POTASSIUM 4.2   TSH 0.69        Breast Cancer Screening:    FHS-7:        No data to display                Patient under 40 years of age: Routine Mammogram Screening not recommended.   Pertinent mammograms are reviewed under the imaging tab.    History of abnormal Pap smear: NO - age 30-65 PAP every 5 years with negative HPV co-testing recommended      Latest Ref Rng & Units 4/7/2023     1:54 PM 10/21/2019    10:44 AM 10/21/2019    10:37 AM   PAP / HPV    PAP  Negative for Intraepithelial Lesion or Malignancy (NILM)      PAP (Historical)    NIL    HPV 16 DNA Negative Negative  Negative     HPV 18 DNA Negative Negative  Negative     Other HR HPV Negative Negative  Negative       Reviewed and updated as needed this visit by clinical staff   Tobacco  Allergies  Meds              Reviewed and updated as needed this visit by Provider                 Past Medical History:   Diagnosis Date    Abnormal Pap smear of cervix 2008    LSIL    Cervical dysplasia     LEEP    Eczema     Hip dysplasia     IUD (intrauterine device) in place 2021    Mirena      Past Surgical History:   Procedure Laterality Date    COLPOSCOPY CERVIX, BIOPSY CERVIX, ENDOCERVICAL CURETTAGE, COMBINED      moderate dysplasia    HC COLP CERVIX/UPPER VAGINA W LOOP ELEC BX CERVIX      HC INSERTION INTRAUTERINE DEVICE  ,     Kary Estrellita    HC REMOVE INTRAUTERINE DEVICE  ,     HC REPAIR OF NASAL SEPTUM       OB History    Para Term  AB Living   4 2 2 0 2 2   SAB IAB Ectopic Multiple Live Births   1 0 0 0 2      # Outcome Date GA Lbr Patrick/2nd Weight Sex Delivery Anes PTL Lv   4 Term 21 41w0d 02:56 / 00:16 3.59 kg (7 lb 14.6 oz) M Vag-Spont EPI N CARYN      Name: DELVIN LANGE      Apgar1: 9  Apgar5: 9   3 Term 19 40w4d  3.43 kg (7 lb 9 oz) F  EPI N CARYN      Name: Mariza      Apgar1: 8  Apgar5: 9   2 SAB      SAB      1 AB 2007     IAB          Review of Systems   Constitutional:  Negative for chills and fever.   HENT:  Negative for congestion, ear pain, hearing loss and sore throat.    Eyes:  Negative for pain and visual disturbance.   Respiratory:  Negative for cough and shortness of breath.    Cardiovascular:  Negative for chest pain, palpitations and peripheral edema.   Gastrointestinal:  Negative for abdominal pain, constipation, diarrhea, heartburn, hematochezia and nausea.   Breasts:  Negative for tenderness,  "breast mass and discharge.   Genitourinary:  Negative for dysuria, frequency, genital sores, hematuria, pelvic pain, urgency, vaginal bleeding and vaginal discharge.   Musculoskeletal:  Positive for myalgias. Negative for arthralgias and joint swelling.   Skin:  Negative for rash.   Neurological:  Negative for dizziness, weakness, headaches and paresthesias.   Psychiatric/Behavioral:  Negative for mood changes. The patient is not nervous/anxious.           OBJECTIVE:   /68   Pulse 79   Temp 97.4  F (36.3  C) (Temporal)   Resp 18   Ht 1.689 m (5' 6.5\")   Wt 64.4 kg (142 lb)   LMP 07/26/2023 (Approximate)   SpO2 97%   BMI 22.58 kg/m    Physical Exam  GENERAL: healthy, alert and no distress  EYES: Eyes grossly normal to inspection, PERRL and conjunctivae and sclerae normal  HENT: ear canals and TM's normal, nose and mouth without ulcers or lesions  NECK: no adenopathy, no asymmetry, masses, or scars and thyroid normal to palpation  RESP: lungs clear to auscultation - no rales, rhonchi or wheezes  BREAST: Deferred per guidelines- asymptomatic per pt  CV: regular rate and rhythm, normal S1 S2, no S3 or S4, no murmur, click or rub, no peripheral edema and peripheral pulses strong  ABDOMEN: soft, nontender, no hepatosplenomegaly, no masses and bowel sounds normal   (female): deferred per pt, no concerns.  Thinking of having IUD removed.  MS: no gross musculoskeletal defects noted, no edema, no CVA tenderness  SKIN: no suspicious lesions POSITIVE perioral dermatitis mild currently  NEURO: Normal strength and tone, cranial nerves intact, mentation intact and speech normal  PSYCH: mentation appears normal, affect normal/bright  LYMPH: no cervical, supraclavicular adenopathy    Diagnostic Test Results:  Labs reviewed in Epic  See orders    ASSESSMENT/PLAN:       ICD-10-CM    1. Routine general medical examination at a health care facility  Z00.00       2. Need for hepatitis C screening test  Z11.59 Hepatitis C " Screen Reflex to HCV RNA Quant and Genotype     Hepatitis C Screen Reflex to HCV RNA Quant and Genotype      3. Perioral dermatitis  L71.0 spironolactone (ALDACTONE) 25 MG tablet     Comprehensive metabolic panel (BMP + Alb, Alk Phos, ALT, AST, Total. Bili, TP)     Comprehensive metabolic panel (BMP + Alb, Alk Phos, ALT, AST, Total. Bili, TP)      4. Screening for thyroid disorder  Z13.29 TSH with free T4 reflex     TSH with free T4 reflex      5. Screening cholesterol level  Z13.220 Lipid panel reflex to direct LDL Fasting     Lipid panel reflex to direct LDL Fasting      6. Screening for diabetes mellitus  Z13.1 Comprehensive metabolic panel (BMP + Alb, Alk Phos, ALT, AST, Total. Bili, TP)     Comprehensive metabolic panel (BMP + Alb, Alk Phos, ALT, AST, Total. Bili, TP)          Patient has been advised of split billing requirements and indicates understanding: Yes      COUNSELING:  Reviewed preventive health counseling, as reflected in patient instructions        She reports that she has never smoked. She has never been exposed to tobacco smoke. She has never used smokeless tobacco.          NAYELY GUZMAN  [facility] NICHOLAS

## 2023-08-05 NOTE — RESULT ENCOUNTER NOTE
Rasheed Scott,    Thank you for your recent office visit.    Here are your recent results.  Normal thyroid labs. Normal cholesterol, normal glucose- you are not diabetic, normal kidney function/liver function. Normal hep c screening- national standard test. Keep up the good work!    Feel free to contact me via Flotype or call the clinic at [phone].    Sincerely,    DEBBY Campbell, FNP-BC

## 2023-08-31 NOTE — PATIENT INSTRUCTIONS
If you have labs or imaging done, the results will automatically release in [facility] without an interpretation.  Your health care professional will review those results and send an interpretation with recommendations as soon as possible, but this may be 1-3 business days.    If you have any questions regarding your visit, please contact your care team.     [facility]: [phone]  [facility] CLINIC HOURS TELEPHONE NUMBER       MD Tesha Em - Certified Medical Assistant     Marylin- LEAD RN  Monserrat-JASON Jennings-JASON Cerda-  Bernie-     Monday- [place]  8:00 a.m - 5:00 p.m    Tuesday- Surgery        Thursday- [place]  8:00 a.m - 5:00 p.m.    Friday- [place]  7:30 a.m - 4:00 p.m. [facility]  [address]  [place] [address]  [phone] Fax  Imaging Scheduling [phone]    [facility] Labor and Delivery  [address] [place] [address] [address].  [place], [place] [address] [phone]    [facility]  [address], [place] [address] [phone] Fax  Imaging Scheduling [phone]     Urgent Care locations:  [facility] Monday-Friday  10 am - 8 pm  Saturday and Sunday   9 am - 5 pm  Monday-Friday   10 am - 8 pm  Saturday and Sunday   9 am - 5 pm   [phone]     **Surgeries** Our Surgery Schedulers will contact you to schedule. If you do not receive a call within 3 business days, please call [phone].    If you need a medication refill, please contact your pharmacy. Please allow 3 business days for your refill to be completed.    As always, thank you for trusting us with your healthcare needs!

## 2023-09-01 ENCOUNTER — OFFICE VISIT (OUTPATIENT)
Dept: OBGYN | Facility: CLINIC | Age: 33
End: 2023-09-01
Payer: COMMERCIAL

## 2023-09-01 VITALS — WEIGHT: 142 LBS | SYSTOLIC BLOOD PRESSURE: 112 MMHG | DIASTOLIC BLOOD PRESSURE: 64 MMHG | BODY MASS INDEX: 22.58 KG/M2

## 2023-09-01 DIAGNOSIS — Z30.432 ENCOUNTER FOR IUD REMOVAL: ICD-10-CM

## 2023-09-01 DIAGNOSIS — Z30.015 ENCOUNTER FOR INITIAL PRESCRIPTION OF VAGINAL RING HORMONAL CONTRACEPTIVE: Primary | ICD-10-CM

## 2023-09-01 DIAGNOSIS — T83.32XA INTRAUTERINE CONTRACEPTIVE DEVICE THREADS LOST, INITIAL ENCOUNTER: ICD-10-CM

## 2023-09-01 PROBLEM — L29.3 PRURITUS OF GENITALIA: Status: RESOLVED | Noted: 2023-04-07 | Resolved: 2023-09-01

## 2023-09-01 PROBLEM — N94.818 VULVAR DISCOMFORT: Status: RESOLVED | Noted: 2023-04-07 | Resolved: 2023-09-01

## 2023-09-01 PROCEDURE — 58301 REMOVE INTRAUTERINE DEVICE: CPT | Performed by: OBSTETRICS & GYNECOLOGY

## 2023-09-01 PROCEDURE — 99213 OFFICE O/P EST LOW 20 MIN: CPT | Mod: 25 | Performed by: OBSTETRICS & GYNECOLOGY

## 2023-09-01 RX ORDER — ETONOGESTREL AND ETHINYL ESTRADIOL VAGINAL RING .015; .12 MG/D; MG/D
RING VAGINAL
Qty: 3 EACH | Refills: 3 | Status: SHIPPED | OUTPATIENT
Start: 2023-09-01 | End: 2024-07-11

## 2023-09-01 NOTE — PROGRESS NOTES
OB/GYN      NAME:  Sonia Candelario  PCP:  MaricarmenKaren  MRN:  [id number]    Impression / Plan     32 year old  with:      ICD-10-CM    1. Encounter for initial prescription of vaginal ring hormonal contraceptive  Z30.015 etonogestrel-ethinyl estradiol (NUVARING) 0.12-0.015 MG/24HR vaginal ring      2. Intrauterine contraceptive device threads lost, initial encounter  T83.32XA       3. Encounter for IUD removal  Z30.432 REMOVE INTRAUTERINE DEVICE          Discussed management options, which include continued observation given the normal ultrasound and plans for vasectomy in the near future.  Alternatively she can exchange the IUD for removal and use a different form of contraception.  Patient prefers a method that may help her acne and is not interested in the pill.  She is interested in the NuvaRing, which is prescribed today.  Instructions and precautions given.  If she has irregular bleeding at the time of her upcoming trip, she will let me know and we will consider adding progesterone     Chief Complaint     Chief Complaint   Patient presents with    IUD       HPI     Sonia Candelario is a  32 year old female who is seen for IUD strings lost. Patient saw Elizabeth 23 - IUD strings lost. IUD was placed 21 at her PP visit and strings cut shorter per patient request. US 4/10/23 showed appropriate placement.    Patient's last menstrual period was 2023 (approximate).    She has been having more pelvic pain, which prompted the ultrasound.  She feels like the cramping may be getting worse.  She also feels like her periods may be heavier, side, last one, which was quite light.  Her  will be having a vasectomy and about 2 weeks.  She is concerned about the risk of pregnancy given the fact she cannot feel the strings.        Problem List     Patient Active Problem List    Diagnosis Date Noted    Perioral dermatitis 2023     Priority: Medium    LSIL (low  grade squamous intraepithelial lesion) on Pap smear 12/15/2011     Priority: Medium     8/25/10 pap LSIL.  Per transferred records, colposcopy was completed which showed moderate dysplasia.  2010 LEEP-   12/2/11 pap NIL/neg HPV. Plan-- pap due in 6 months (6/2/12)   5/16/12 pap NIL/neg HPV. Plan-- pap in 1 year (due 5/16/13)   5/13/13 pap NIL. Plan-- resume routine screening  2016 NIL pap, Neg HPV. (Found in Care Everywhere).  10/21/19 NIL pap, Neg HPV.  4/7/23 NIL pap, neg HPV. Cotest every 3 years       CARDIOVASCULAR SCREENING; LDL GOAL LESS THAN 160 12/02/2011     Priority: Medium       Medications     Current Outpatient Medications   Medication    etonogestrel-ethinyl estradiol (NUVARING) 0.12-0.015 MG/24HR vaginal ring    sertraline (ZOLOFT) 50 MG tablet    spironolactone (ALDACTONE) 25 MG tablet    doxycycline monohydrate (MONODOX) 100 MG capsule     No current facility-administered medications for this visit.        Allergies     No Known Allergies    ROS     Pertinent positives and negatives are listed in the HPI.     Physical Exam   Vitals: /64   Wt 64.4 kg (142 lb)   LMP 08/24/2023 (Approximate)   BMI 22.58 kg/m      General: Comfortable, no obvious distress  Psych: Alert and orientated x 3. Appropriate affect, good insight.   : Normal female external genitalia.  No lesions.  Urethral meatus normal.  Speculum exam reveals a normal vaginal vault, normal cervix.  No abnormal discharge.     Labs/Imaging       I have personally reviewed the labs/imaging and the findings were:    US 4/10/23   Uterus 8.9 x 4.4 x 6.6 cm.  EMS 8 mm with IUD in appropriate position  Normal ovaries bilaterally.  No free fluid.       IUD REMOVAL    After verifying consent, the cervix was visualized using a speculum.  The IUD strings are not visible. The cytobrush was used to attempt to pull them out of the cervical canal but this was not successful.  The polyp forceps were placed in the outer part of the cervical canal  and the IUD strings grasped.   The IUD was removed without difficulty and was verified to be intact.  The patient tolerated the procedure well.     Amirah Mueller MD     Side effect of treatment, reviewed US, prescription medication management.

## 2023-09-19 ENCOUNTER — OFFICE VISIT (OUTPATIENT)
Dept: DERMATOLOGY | Facility: CLINIC | Age: 33
End: 2023-09-19
Payer: COMMERCIAL

## 2023-09-19 DIAGNOSIS — L71.0 PERIORAL DERMATITIS: Primary | ICD-10-CM

## 2023-09-19 PROCEDURE — 99204 OFFICE O/P NEW MOD 45 MIN: CPT | Performed by: DERMATOLOGY

## 2023-09-19 RX ORDER — TACROLIMUS 1 MG/G
OINTMENT TOPICAL DAILY
Qty: 60 G | Refills: 11 | Status: SHIPPED | OUTPATIENT
Start: 2023-09-19 | End: 2024-07-15

## 2023-09-19 ASSESSMENT — PAIN SCALES - GENERAL: PAINLEVEL: MODERATE PAIN (4)

## 2023-09-19 NOTE — PROGRESS NOTES
[facility] Dermatology Note  Encounter Date: Sep 19, 2023  Office Visit     Dermatology Problem List:  1. 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    ____________________________________________    Assessment & Plan:    # Perioral dermatitis. Chronic, not-at-goal.   - Discussed pathophysiology of rosacea and its various treatment options.  - Discussed how spironolactone takes 4-5 months to fully kick in, so patient agreed to hold off on increasing dosage (was started 8/4/2023 at 25mg once a day, now at 25mg twice a day).  - Start tacrolimus 0.1% ointment at night. Patient advised to refrigerate medication to help avoid side effects.  - Future tx to consider: minocycline, pimecrolimus (cream).    Procedures Performed: None.    Follow-up: 4 month(s) in-person, or earlier for new or changing lesions    Staff and Medical Student:     Nicolasa Moore  3rd Year Medical Student    I was present with the medical student who participated in the service and in the documentation of the note. I have verified the history and personally performed the physical exam and medical decision making. I agree with the assessment and plan of care as documented in the note.  Heather Gaston MD   ____________________________________________    CC: Derm Problem (Perioral dermatitis- menocycline and doxicycline have not worked. Has been affected since 2020. More recently has been cystic. Slight improvement with spironolactone.)    HPI:  Ms. Sonia Fullermeek Valderramaianestor is a(n) 32 year old female who presents today as a new patient for perioral dermatitis.  - Says it started in 2020 while nursing first child and pregnant with second.  - 15% azelaic acid didn't help and dried the skin  - Metrogel also didn't help.  - Doxycycline didn't help and had GI side effects (heartburn). 30 day course  x2 and 1-2 partial courses.  - Spironolactone started early August with light-headedness but now fine. Thinks cystic acne cleared after starting spironolactone.  - IUD (progesterone only) taken out three weeks ago with no changes. Nuvaring (estrogen+progesterone) put in instead.  - Uses CeraVe moisturizing lotion and Neutrogena tinted sunscreen with SPF 20 and retinol.    ROS:  Pertinent negative: whiteheads, blackheads    Patient is otherwise feeling well, without additional skin concerns.    FH: No skin cancers. No autoimmune diseases. Mother had rosacea that developed later in adulthood (minocycline seemed to help).    SH: Has two kids.    Labs:  None reviewed.    Physical Exam:  Vitals: LMP 08/24/2023 (Approximate)   SKIN: Focused examination of face was performed.  - perioral red papules  - No other lesions of concern on areas examined.     Medications:  Current Outpatient Medications   Medication    doxycycline monohydrate (MONODOX) 100 MG capsule    etonogestrel-ethinyl estradiol (NUVARING) 0.12-0.015 MG/24HR vaginal ring    sertraline (ZOLOFT) 50 MG tablet    spironolactone (ALDACTONE) 25 MG tablet     No current facility-administered medications for this visit.      Past Medical History:   Patient Active Problem List   Diagnosis    CARDIOVASCULAR SCREENING; LDL GOAL LESS THAN 160    LSIL (low grade squamous intraepithelial lesion) on Pap smear    Perioral dermatitis     Past Medical History:   Diagnosis Date    Abnormal Pap smear of cervix 08/25/2008    LSIL    Cervical dysplasia 2010    LEEP    Eczema     Hip dysplasia     IUD (intrauterine device) in place 09/30/2021    Mirena        CC No referring provider defined for this encounter. on close of this encounter.     Statement Selected

## 2023-09-19 NOTE — LETTER
9/19/2023       RE: Sonia Ruddgilbertocorine Janniemiguel  [address]  [place] [address]     Dear Colleague,    Thank you for referring your patient, Sonia Candelario, to the [facility] [place] at [facility]. Please see a copy of my visit note below.    [facility] Dermatology Note  Encounter Date: Sep 19, 2023  Office Visit     Dermatology Problem List:  1. 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    ____________________________________________    Assessment & Plan:    # Perioral dermatitis. Chronic, not-at-goal.   - Discussed pathophysiology of rosacea and its various treatment options.  - Discussed how spironolactone takes 4-5 months to fully kick in, so patient agreed to hold off on increasing dosage (was started 8/4/2023 at 25mg once a day, now at 25mg twice a day).  - Start tacrolimus 0.1% ointment at night. Patient advised to refrigerate medication to help avoid side effects.  - Future tx to consider: minocycline, pimecrolimus (cream).    Procedures Performed: None.    Follow-up: 4 month(s) in-person, or earlier for new or changing lesions    Staff and Medical Student:     Nicolasa Moore  3rd Year Medical Student    I was present with the medical student who participated in the service and in the documentation of the note. I have verified the history and personally performed the physical exam and medical decision making. I agree with the assessment and plan of care as documented in the note.  Heather Gaston MD   ____________________________________________    CC: Derm Problem (Perioral dermatitis- menocycline and doxicycline have not worked. Has been affected since 2020. More recently has been cystic. Slight improvement with spironolactone.)    HPI:  Ms. Sonia Garcia  Carrie Candelario is a(n) 32 year old female who presents today as a new patient for perioral dermatitis.  - Says it started in 2020 while nursing first child and pregnant with second.  - 15% azelaic acid didn't help and dried the skin  - Metrogel also didn't help.  - Doxycycline didn't help and had GI side effects (heartburn). 30 day course x2 and 1-2 partial courses.  - Spironolactone started early August with light-headedness but now fine. Thinks cystic acne cleared after starting spironolactone.  - IUD (progesterone only) taken out three weeks ago with no changes. Nuvaring (estrogen+progesterone) put in instead.  - Uses CeraVe moisturizing lotion and Neutrogena tinted sunscreen with SPF 20 and retinol.    ROS:  Pertinent negative: whiteheads, blackheads    Patient is otherwise feeling well, without additional skin concerns.    FH: No skin cancers. No autoimmune diseases. Mother had rosacea that developed later in adulthood (minocycline seemed to help).    SH: Has two kids.    Labs:  None reviewed.    Physical Exam:  Vitals: LMP 08/24/2023 (Approximate)   SKIN: Focused examination of face was performed.  - perioral red papules  - No other lesions of concern on areas examined.     Medications:  Current Outpatient Medications   Medication    doxycycline monohydrate (MONODOX) 100 MG capsule    etonogestrel-ethinyl estradiol (NUVARING) 0.12-0.015 MG/24HR vaginal ring    sertraline (ZOLOFT) 50 MG tablet    spironolactone (ALDACTONE) 25 MG tablet     No current facility-administered medications for this visit.      Past Medical History:   Patient Active Problem List   Diagnosis    CARDIOVASCULAR SCREENING; LDL GOAL LESS THAN 160    LSIL (low grade squamous intraepithelial lesion) on Pap smear    Perioral dermatitis     Past Medical History:   Diagnosis Date    Abnormal Pap smear of cervix 08/25/2008    LSIL    Cervical dysplasia 2010    LEEP    Eczema     Hip dysplasia     IUD (intrauterine device) in place 09/30/2021     Mirena        CC No referring provider defined for this encounter. on close of this encounter.

## 2023-09-19 NOTE — NURSING NOTE
Dermatology Rooming Note    Sonia Candelario's goals for this visit include:   Chief Complaint   Patient presents with    Derm Problem     Perioral dermatitis- menocycline and doxicycline have not worked. Has been affected since 2020. More recently has been cystic. Slight improvement with spironolactone.     Coby Scott, EMT

## 2023-09-19 NOTE — PATIENT INSTRUCTIONS
Let's try applying tacrolimus at night. There may be side effects (burning, stinging). Put it in fridge to lower chance of these side effects.    Let's avoid prescription strength retinoids to avoid irritating your skin.

## 2023-12-04 ENCOUNTER — MYC MEDICAL ADVICE (OUTPATIENT)
Dept: FAMILY MEDICINE | Facility: CLINIC | Age: 33
End: 2023-12-04
Payer: COMMERCIAL

## 2023-12-04 DIAGNOSIS — F41.1 GENERALIZED ANXIETY DISORDER: ICD-10-CM

## 2024-03-08 ENCOUNTER — TELEPHONE (OUTPATIENT)
Dept: SURGERY | Facility: CLINIC | Age: 34
End: 2024-03-08
Payer: COMMERCIAL

## 2024-03-08 NOTE — TELEPHONE ENCOUNTER
Left voicemail for patient regarding scheduling surgery with Dr. Cherry.    Provided contact number to discuss.    P: [phone]    __    Thu Luther, on 3/8/2024 at 9:57 AM

## 2024-03-22 NOTE — TELEPHONE ENCOUNTER
Left voicemail for patient regarding scheduling surgery with Dr. Cherry.    Provided contact number to discuss.    P: [phone]    __    Thu Luther, on 3/22/2024 at 1:22 PM

## 2024-03-29 NOTE — TELEPHONE ENCOUNTER
Left voicemail for patient regarding scheduling surgery with Dr. Cherry.    Provided contact number to discuss.    P: [phone]    __    Thu Luther, on 3/29/2024 at 10:38 AM

## 2024-04-01 NOTE — TELEPHONE ENCOUNTER
Third attempt. No return call from patient.     Sending unable to contact letter to patient as final attempt.    April Hernandez on 4/1/2024 at 10:44 AM

## 2024-04-04 ENCOUNTER — E-VISIT (OUTPATIENT)
Dept: FAMILY MEDICINE | Facility: CLINIC | Age: 34
End: 2024-04-04
Payer: COMMERCIAL

## 2024-04-04 DIAGNOSIS — S39.012D STRAIN OF LUMBAR REGION, SUBSEQUENT ENCOUNTER: Primary | ICD-10-CM

## 2024-04-04 PROCEDURE — 99421 OL DIG E/M SVC 5-10 MIN: CPT | Performed by: NURSE PRACTITIONER

## 2024-04-05 RX ORDER — CYCLOBENZAPRINE HCL 10 MG
10 TABLET ORAL 3 TIMES DAILY PRN
Qty: 90 TABLET | Refills: 1 | Status: SHIPPED | OUTPATIENT
Start: 2024-04-05 | End: 2024-08-09

## 2024-04-05 RX ORDER — PREDNISONE 20 MG/1
40 TABLET ORAL DAILY
Qty: 10 TABLET | Refills: 0 | Status: SHIPPED | OUTPATIENT
Start: 2024-04-05 | End: 2024-04-10

## 2024-04-12 NOTE — TELEPHONE ENCOUNTER
No return call from patient after sending letter. Will wait for patient to call surgery scheduling to schedule procedure with Dr. Cherry. Patient has call back to surgery scheduling.    April Hernandez on 4/12/2024 at 11:00 AM

## 2024-06-17 ENCOUNTER — TELEPHONE (OUTPATIENT)
Dept: FAMILY MEDICINE | Facility: CLINIC | Age: 34
End: 2024-06-17
Payer: COMMERCIAL

## 2024-06-19 ENCOUNTER — TELEPHONE (OUTPATIENT)
Dept: FAMILY MEDICINE | Facility: CLINIC | Age: 34
End: 2024-06-19
Payer: COMMERCIAL

## 2024-07-11 ENCOUNTER — MYC REFILL (OUTPATIENT)
Dept: FAMILY MEDICINE | Facility: CLINIC | Age: 34
End: 2024-07-11
Payer: COMMERCIAL

## 2024-07-11 DIAGNOSIS — F41.1 GENERALIZED ANXIETY DISORDER: ICD-10-CM

## 2024-07-11 DIAGNOSIS — L71.0 PERIORAL DERMATITIS: ICD-10-CM

## 2024-07-11 RX ORDER — SPIRONOLACTONE 25 MG/1
25 TABLET ORAL 2 TIMES DAILY
Qty: 180 TABLET | Refills: 0 | Status: SHIPPED | OUTPATIENT
Start: 2024-07-11 | End: 2024-07-15

## 2024-07-15 ENCOUNTER — OFFICE VISIT (OUTPATIENT)
Dept: DERMATOLOGY | Facility: CLINIC | Age: 34
End: 2024-07-15
Payer: COMMERCIAL

## 2024-07-15 DIAGNOSIS — Z12.83 SKIN CANCER SCREENING: Primary | ICD-10-CM

## 2024-07-15 DIAGNOSIS — L71.0 PERIORAL DERMATITIS: ICD-10-CM

## 2024-07-15 PROCEDURE — 99214 OFFICE O/P EST MOD 30 MIN: CPT | Mod: GC | Performed by: DERMATOLOGY

## 2024-07-15 RX ORDER — SPIRONOLACTONE 25 MG/1
25 TABLET ORAL 2 TIMES DAILY
Qty: 180 TABLET | Refills: 0 | Status: SHIPPED | OUTPATIENT
Start: 2024-07-15 | End: 2024-08-09

## 2024-07-15 RX ORDER — TACROLIMUS 1 MG/G
OINTMENT TOPICAL 2 TIMES DAILY
Qty: 60 G | Refills: 11 | Status: SHIPPED | OUTPATIENT
Start: 2024-07-15

## 2024-07-15 ASSESSMENT — PAIN SCALES - GENERAL: PAINLEVEL: NO PAIN (0)

## 2024-07-15 NOTE — PATIENT INSTRUCTIONS
Increase tacrolimus ointment to twice daily. If not seeing improvement, please send us a [employer] message and we can consider increasing the spironolactone.        What is perioral dermatitis?    Perioral (or periorificial) dermatitis is a common acne or rosacea-like rash that develops around the mouth, nose and eyes of young adults.    WHAT CAUSES PERIORAL DERMATITIS?    We don t know the exact cause of perioral dermatitis. Sometimes perioral dermatitis is triggered by steroid medicines that are taken by mouth, applied to the skin or inhaled. One possible cause is an overgrowth of normal skin mites and yeast.    PERIORAL DERMATITIS FACTS    Perioral dermatitis looks like many tiny pink or skin-colored bumps that usually come close to the lips, but don t go onto the lips.  Perioral dermatitis may appear at any age in childhood and adolescence. Girls and boys both get it.  The rash of perioral dermatitis is usually not very bothersome, although it can cause mild itching or burning.  Many people with perioral dermatitis have a history of eczema or asthma. This may be because patients with eczema and asthma need to use steroid medications (and may have skin barrier problems).  Topical steroids may at first seem like they help perioral dermatitis, but the rash often comes back and may even get worse as soon as topical steroids are stopped. Because of this, many people want to start the topical steroids again, but it is important to try to break this cycle.    HOW IS PERIORAL DERMATITIS DIAGNOSED?    Your doctor will be able to diagnose perioral dermatitis by talking with you and doing a careful skin examination. Sometimes tests may be necessary to rule out other causes.    HOW IS PERIORAL DERMATITIS TREATED?    There are many ways to treat perioral dermatitis, and sometimes you need to try several different medications before finding the one that works best for you. The rash needs to be treated for at least 3-6 weeks to  fully improve. Your doctor will decide which medications to start with based on how severe the rash is and which treatments have helped before. The following treatments have all been used to successfully clear perioral dermatitis:    Remove Triggers  If you are using topical steroids to treat perioral dermatitis, you should talk with your doctor about how to stop them. Even with a slow taper, there may be a temporary flare of the rash. If you need inhaled or oral steroids for other health conditions, you should continue them. Take care to keep inhaled or nasal steroids from touching the skin. If they do touch the skin, wipe them off right away. If possible, talk to your doctor about switching from a mask to a spacer to inhale steroids, as this can help avoid contact with the skin.    Topical Antibiotics  Topical antibiotics are usually the starting point in treating perioral dermatitis. Examples of topical antibiotics include metronidazole, clindamycin, erythromycin, sulfacetamide and azelaic acid.    Topical Non-Steroid Anti-Inflammatory Creams  Topical non-steroid anti-inflammatory creams help calm down the inflammation in the skin. Examples are pimecrolimus cream and tacrolimus ointment. Some people say that they feel a mild burning with the first few uses, but this tends to go away.    Anti-Mite Therapies  Anti-mite creams like permethrin or ivermectin may be used to treat perioral dermatitis. Some patients have mild peeling after use.    Oral Antibiotics  If perioral dermatitis is severe or does not respond to topical creams, your doctor may prescribe an oral antibiotic. Oral antibiotics work because they help reduce inflammation. Adults and older children with perioral dermatitis are often treated with tetracyclines, but these are rarely used in children under the age of 8 because they can permanently stain the teeth. Oral antibiotics used for young children are azithromycin, erythromycin and  clarithromycin.    WHAT SHOULD BE EXPECTED AFTER TREATMENT?    Even after the rash clears with the right treatment, there is still a chance the perioral dermatitis may eventually come back. Scars from the rash are unlikely but have been seen in a few patients. Follow up with your doctor regularly and let your doctor know if the rash comes back.      Contributing SPD Members:  Leo Khalil MD, Blair Riley MD    Committee Reviewers:  Enzo Mariscal MD, Donell Carl MD, Brisa Dahl MD    Expert Reviewer:  Andrews Herman MD    The Society for Pediatric Dermatology and Opentopic cannot be held responsible for any errors or for any consequences arising from the use of the information contained in this handout. Handout originally published in Pediatric Dermatology: Vol. 34, No. 5 (2017).      2017 The Society for Pediatric Dermatology      Patient Education   Skin Cancer Prevention: Care Instructions  Your Care Instructions     Skin cancer is the abnormal growth of cells in the skin. It usually appears as a growth that changes in color, shape, or size. This can be a sore that does not heal or a change in a mole. Skin cancer is almost always curable when found early and treated. So it is important to see your doctor if you have any of these changes in your skin.  Skin cancer is the most common type of cancer. It often appears on areas of the body that have been exposed to the sun, such as the head, face, neck, back, chest, or shoulders.  Follow-up care is a key part of your treatment and safety. Be sure to make and go to all appointments, and call your doctor if you are having problems. It's also a good idea to know your test results and keep a list of the medicines you take.  How can you care for yourself at home?  Wear a wide-brimmed hat and long sleeves and pants if you are going to be outdoors for a long time.  Avoid the sun between 10 a.m. and 4 p.m., which is the peak time for UV rays.  Wear  "sunscreen on exposed skin. Make sure to use a broad-spectrum sunscreen that has a sun protection factor (SPF) of 30 or higher. Use it every day, even when it is cloudy.  Do not use tanning booths or sunlamps.  Use lip balm or cream that has sun protection factor (SPF) to protect your lips from getting sunburned.  Wear sunglasses that block UV rays.  When should you call for help?  Watch closely for changes in your health, and be sure to contact your doctor if:    You see a change in your skin, such as a growth or mole that:  Grows bigger. This may happen very slowly.  Changes color.  Changes shape.  Starts to bleed easily.     You have swollen glands in your armpits, groin, or neck.     You do not get better as expected.   Where can you learn more?  Go to https://www.[employer].net/patiented  Enter P392 in the search box to learn more about \"Skin Cancer Prevention: Care Instructions.\"  Current as of: November 16, 2023               Content Version: 14.0    2533-9155 [employer].   Care instructions adapted under license by your healthcare professional. If you have questions about a medical condition or this instruction, always ask your healthcare professional. [employer] disclaims any warranty or liability for your use of this information.         "

## 2024-07-15 NOTE — PROGRESS NOTES
[facility] Dermatology Note  Encounter Date: Jul 15, 2024  Office Visit     Dermatology Problem List:  FBSE 7/15/24  1. Perioral dermatitis  - current tx: spironolactone, tacrolimus BID   - past tx: doxycycline, metronidazole, azelaic acid    ____________________________________________    Assessment & Plan:     # Perioral dermatitis. chronic, not-at-goal of clear.   - Continue spironolactone 25 mg BID  - Continue tacrolimus 0.1% ointment; however increase to BID dosing  - Patient to [employer] message if no improvement with tacrolimus increased frequency, if so consider increasing spironolactone dose   - Future tx to consider: minocycline, pimecrolimus (cream)     # Benign skin exam including benign nevi, solar lentigines and cherry angiomas   - Diagnoses reviewed and reassurance provided on benign nature.   - ABCDEs: Counseled ABCDEs of melanoma: Asymmetry, Border (irregularity), Color (not uniform, changes in color), Diameter (greater than 6 mm which is about the size of a pencil eraser), and Evolving (any changes in preexisting moles).  - Sun protection: Counseled SPF30+ sunscreen and reapplication.     Procedures Performed:   None    Follow-up: 6 month(s) in-person, or earlier for new or changing lesions    Staff and Resident:  Patient seen and staffed with Dr. Gaston.     Gail Herrera MD  Dermatology Resident PGY-2  I, Heather Gaston MD, saw this patient with the resident and agree with the resident s findings and plan of care as documented in the resident s note.    ____________________________________________    CC: No chief complaint on file.    HPI:  Ms. Sonia Candelario is a(n) 33 year old female who presents today as a return patient for perioral dermatitis.    Her last visit was on 9/19/23, at which time she was started on spironolactone 25 mg BID and tacrolimus.     Since then , patient reports that the tacrolimus has really helped. She denies any side effects  "currently from the spironolactone, but does report some initial lightheadedness with starting it. She reports that masks typically flare her, but otherwise reports that she is never completely clear.     She would also like a full body skin exam. She reports being \"pretty good\" about wearing sunscreen. Denies personal history of sunscreen, but does report father with history of AK.     Only changes to medical history including having IUD removed (prior to last visit) and starting new Nuvaring in October of last year.      Patient is otherwise feeling well, without additional skin concerns.    Labs Reviewed:  N/A    Physical Exam:  Vitals: There were no vitals taken for this visit.  SKIN: Full skin, which includes the head/face, both arms, chest, back, abdomen,both legs, genitalia and/or groin buttocks, digits and/or nails, was examined.  - There are dome shaped bright red papules on the trunk and extremities.   - Multiple regular brown pigmented macules and papules are identified on the trunk and extremities.   - There are fine lines and dyspigmentation on sun exposed areas of the face and chest.  - Inflammatory papules to the cheek and right nose  - No other lesions of concern on areas examined.     Medications:  Current Outpatient Medications   Medication Sig Dispense Refill    cyclobenzaprine (FLEXERIL) 10 MG tablet Take 1 tablet (10 mg) by mouth 3 times daily as needed for muscle spasms 90 tablet 1    etonogestrel-ethinyl estradiol (NUVARING) 0.12-0.015 MG/24HR vaginal ring Insert one (1) ring vaginally and leave in place for 3 consecutive weeks (21 days), then remove for 1 week. 3 each 0    sertraline (ZOLOFT) 50 MG tablet Take 1 tablet (50 mg) by mouth daily 90 tablet 0    spironolactone (ALDACTONE) 25 MG tablet Take 1 tablet (25 mg) by mouth 2 times daily 180 tablet 0    tacrolimus (PROTOPIC) 0.1 % external ointment Apply topically daily 60 g 11     No current facility-administered medications for this visit. "      Past Medical History:   Patient Active Problem List   Diagnosis    CARDIOVASCULAR SCREENING; LDL GOAL LESS THAN 160    LSIL (low grade squamous intraepithelial lesion) on Pap smear    Perioral dermatitis     Past Medical History:   Diagnosis Date    Abnormal Pap smear of cervix 08/25/2008    LSIL    Cervical dysplasia 2010    LEEP    Eczema     Hip dysplasia     IUD (intrauterine device) in place 09/30/2021    Mirena       CC Referred Self, MD  No address on file on close of this encounter.

## 2024-07-15 NOTE — LETTER
7/15/2024       RE: Sonia Candelario  [address]  [place] [address]     Dear Colleague,    Thank you for referring your patient, Sonia Candelario, to the [facility] [place] at [facility]. Please see a copy of my visit note below.    [facility] Dermatology Note  Encounter Date: Jul 15, 2024  Office Visit     Dermatology Problem List:  FBSE 7/15/24  1. Perioral dermatitis  - current tx: spironolactone, tacrolimus BID   - past tx: doxycycline, metronidazole, azelaic acid    ____________________________________________    Assessment & Plan:     # Perioral dermatitis. chronic, not-at-goal of clear.   - Continue spironolactone 25 mg BID  - Continue tacrolimus 0.1% ointment; however increase to BID dosing  - Patient to [facility] message if no improvement with tacrolimus increased frequency, if so consider increasing spironolactone dose   - Future tx to consider: minocycline, pimecrolimus (cream)     # Benign skin exam including benign nevi, solar lentigines and cherry angiomas   - Diagnoses reviewed and reassurance provided on benign nature.   - ABCDEs: Counseled ABCDEs of melanoma: Asymmetry, Border (irregularity), Color (not uniform, changes in color), Diameter (greater than 6 mm which is about the size of a pencil eraser), and Evolving (any changes in preexisting moles).  - Sun protection: Counseled SPF30+ sunscreen and reapplication.     Procedures Performed:   None    Follow-up: 6 month(s) in-person, or earlier for new or changing lesions    Staff and Resident:  Patient seen and staffed with Dr. aGston.     Gail Herrera MD  Dermatology Resident PGY-2  I, Heather Gaston MD, saw this patient with the resident and agree with the resident s findings and plan of care as documented in the resident s note.    ____________________________________________    CC: No chief complaint on  "file.    HPI:  Ms. Sonia Candelario is a(n) 33 year old female who presents today as a return patient for perioral dermatitis.    Her last visit was on 9/19/23, at which time she was started on spironolactone 25 mg BID and tacrolimus.     Since then , patient reports that the tacrolimus has really helped. She denies any side effects currently from the spironolactone, but does report some initial lightheadedness with starting it. She reports that masks typically flare her, but otherwise reports that she is never completely clear.     She would also like a full body skin exam. She reports being \"pretty good\" about wearing sunscreen. Denies personal history of sunscreen, but does report father with history of AK.     Only changes to medical history including having IUD removed (prior to last visit) and starting new Nuvaring in October of last year.      Patient is otherwise feeling well, without additional skin concerns.    Labs Reviewed:  N/A    Physical Exam:  Vitals: There were no vitals taken for this visit.  SKIN: Full skin, which includes the head/face, both arms, chest, back, abdomen,both legs, genitalia and/or groin buttocks, digits and/or nails, was examined.  - There are dome shaped bright red papules on the trunk and extremities.   - Multiple regular brown pigmented macules and papules are identified on the trunk and extremities.   - There are fine lines and dyspigmentation on sun exposed areas of the face and chest.  - Inflammatory papules to the cheek and right nose  - No other lesions of concern on areas examined.     Medications:  Current Outpatient Medications   Medication Sig Dispense Refill     cyclobenzaprine (FLEXERIL) 10 MG tablet Take 1 tablet (10 mg) by mouth 3 times daily as needed for muscle spasms 90 tablet 1     etonogestrel-ethinyl estradiol (NUVARING) 0.12-0.015 MG/24HR vaginal ring Insert one (1) ring vaginally and leave in place for 3 consecutive weeks (21 days), then remove for " 1 week. 3 each 0     sertraline (ZOLOFT) 50 MG tablet Take 1 tablet (50 mg) by mouth daily 90 tablet 0     spironolactone (ALDACTONE) 25 MG tablet Take 1 tablet (25 mg) by mouth 2 times daily 180 tablet 0     tacrolimus (PROTOPIC) 0.1 % external ointment Apply topically daily 60 g 11     No current facility-administered medications for this visit.      Past Medical History:   Patient Active Problem List   Diagnosis     CARDIOVASCULAR SCREENING; LDL GOAL LESS THAN 160     LSIL (low grade squamous intraepithelial lesion) on Pap smear     Perioral dermatitis     Past Medical History:   Diagnosis Date     Abnormal Pap smear of cervix 08/25/2008    LSIL     Cervical dysplasia 2010    LEEP     Eczema      Hip dysplasia      IUD (intrauterine device) in place 09/30/2021    Ольга Andrade MD  No address on file on close of this encounter.      Dermatology Rooming Note    Sonia Candelario's goals for this visit include:   Chief Complaint   Patient presents with     Derm Problem     Perioral Dermatitis- Follow up    It has gotten a little bit better.        Alex Britt, EMT  Clinic Support  [facility]    [phone]    Employed by [facility] [employer]      Again, thank you for allowing me to participate in the care of your patient.      Sincerely,    Heather Gaston MD

## 2024-07-15 NOTE — PROGRESS NOTES
Dermatology Rooming Note    Sonia Candelario's goals for this visit include:   Chief Complaint   Patient presents with    Derm Problem     Perioral Dermatitis- Follow up    It has gotten a little bit better.        Alex Britt, EMT  Clinic Support  [facility]    [phone]    Employed by [facility] [employer]

## 2024-08-07 SDOH — HEALTH STABILITY: PHYSICAL HEALTH: ON AVERAGE, HOW MANY DAYS PER WEEK DO YOU ENGAGE IN MODERATE TO STRENUOUS EXERCISE (LIKE A BRISK WALK)?: 4 DAYS

## 2024-08-07 SDOH — HEALTH STABILITY: PHYSICAL HEALTH: ON AVERAGE, HOW MANY MINUTES DO YOU ENGAGE IN EXERCISE AT THIS LEVEL?: 40 MIN

## 2024-08-07 ASSESSMENT — SOCIAL DETERMINANTS OF HEALTH (SDOH): HOW OFTEN DO YOU GET TOGETHER WITH FRIENDS OR RELATIVES?: ONCE A WEEK

## 2024-08-09 ENCOUNTER — OFFICE VISIT (OUTPATIENT)
Dept: FAMILY MEDICINE | Facility: CLINIC | Age: 34
End: 2024-08-09
Payer: COMMERCIAL

## 2024-08-09 VITALS
TEMPERATURE: 96.6 F | BODY MASS INDEX: 23.5 KG/M2 | HEART RATE: 89 BPM | SYSTOLIC BLOOD PRESSURE: 114 MMHG | WEIGHT: 146.2 LBS | RESPIRATION RATE: 18 BRPM | OXYGEN SATURATION: 100 % | HEIGHT: 66 IN | DIASTOLIC BLOOD PRESSURE: 72 MMHG

## 2024-08-09 DIAGNOSIS — F41.1 GENERALIZED ANXIETY DISORDER: ICD-10-CM

## 2024-08-09 DIAGNOSIS — L71.0 PERIORAL DERMATITIS: ICD-10-CM

## 2024-08-09 DIAGNOSIS — Z13.1 SCREENING FOR DIABETES MELLITUS: ICD-10-CM

## 2024-08-09 DIAGNOSIS — Z13.29 SCREENING FOR THYROID DISORDER: ICD-10-CM

## 2024-08-09 DIAGNOSIS — Z30.44 ENCOUNTER FOR SURVEILLANCE OF VAGINAL RING HORMONAL CONTRACEPTIVE DEVICE: ICD-10-CM

## 2024-08-09 DIAGNOSIS — Z00.00 ROUTINE GENERAL MEDICAL EXAMINATION AT A HEALTH CARE FACILITY: Primary | ICD-10-CM

## 2024-08-09 DIAGNOSIS — Z13.220 SCREENING CHOLESTEROL LEVEL: ICD-10-CM

## 2024-08-09 PROCEDURE — 99213 OFFICE O/P EST LOW 20 MIN: CPT | Mod: 25 | Performed by: NURSE PRACTITIONER

## 2024-08-09 PROCEDURE — 99395 PREV VISIT EST AGE 18-39: CPT | Performed by: NURSE PRACTITIONER

## 2024-08-09 RX ORDER — SPIRONOLACTONE 25 MG/1
25 TABLET ORAL 2 TIMES DAILY
Qty: 180 TABLET | Refills: 3 | Status: SHIPPED | OUTPATIENT
Start: 2024-08-09

## 2024-08-09 RX ORDER — ETONOGESTREL AND ETHINYL ESTRADIOL VAGINAL RING .015; .12 MG/D; MG/D
RING VAGINAL
Qty: 9 EACH | Refills: 3 | Status: SHIPPED | OUTPATIENT
Start: 2024-08-09

## 2024-08-09 NOTE — PROGRESS NOTES
Preventive Care Visit  [facility] NYAELY ESPINOZA, Family Medicine  Aug 9, 2024      Assessment & Plan     Routine general medical examination at a health care facility      Perioral dermatitis    - spironolactone (ALDACTONE) 25 MG tablet; Take 1 tablet (25 mg) by mouth 2 times daily  - Renal panel (Alb, BUN, Ca, Cl, CO2, Creat, Gluc, Phos, K, Na); Future    Generalized anxiety disorder    - sertraline (ZOLOFT) 50 MG tablet; Take 1 tablet (50 mg) by mouth daily    Encounter for surveillance of vaginal ring hormonal contraceptive device    - etonogestrel-ethinyl estradiol (NUVARING) 0.12-0.015 MG/24HR vaginal ring; Insert one (1) ring vaginally and leave in place for 3 consecutive weeks (21 days), then remove for 1 week.    Screening for diabetes mellitus    - Renal panel (Alb, BUN, Ca, Cl, CO2, Creat, Gluc, Phos, K, Na); Future    Screening cholesterol level    - Lipid panel reflex to direct LDL Fasting; Future    Screening for thyroid disorder    - TSH with free T4 reflex; Future    Patient has been advised of split billing requirements and indicates understanding: Yes        Counseling  Appropriate preventive services were addressed with this patient via screening, questionnaire, or discussion as appropriate for fall prevention, nutrition, physical activity, Tobacco-use cessation, weight loss and cognition.  Checklist reviewing preventive services available has been given to the patient.  Reviewed patient's diet, addressing concerns and/or questions.   She is at risk for psychosocial distress and has been provided with information to reduce risk.       See Patient Instructions    She exercises regularly in the gym    Shane Scott is a 33 year old, presenting for the following:  Physical and Medication Refill    Mental health doing well she feels Zoloft helps.  Weaned off medication for a period of time but feels better on it.  Sees dermatology, tolerating medications has not  had kidney function done with spironolactone.  Using NuvaRing likes this birth control.  She is aware of risks of birth control and symptoms to watch out for.  She is a non-smoker.  She will do fasting labs at work.  Enjoys spending time with family.  Daughter starting pre-k this year.        8/9/2024    11:13 AM   Additional Questions   Roomed by Dana MIRELES         8/9/2024    11:13 AM   Patient Reported Additional Medications   Patient reports taking the following new medications Tacolimus to BID not once daily            Medication Refill            8/7/2024   General Health   How would you rate your overall physical health? Good   Feel stress (tense, anxious, or unable to sleep) Only a little      (!) STRESS CONCERN      8/7/2024   Nutrition   Three or more servings of calcium each day? Yes   Diet: Regular (no restrictions)   How many servings of fruit and vegetables per day? 4 or more   How many sweetened beverages each day? 0-1            8/7/2024   Exercise   Days per week of moderate/strenous exercise 4 days   Average minutes spent exercising at this level 40 min            8/7/2024   Social Factors   Frequency of gathering with friends or relatives Once a week   Worry food won't last until get money to buy more No   Food not last or not have enough money for food? No   Do you have housing? (Housing is defined as stable permanent housing and does not include staying ouside in a car, in a tent, in an abandoned building, in an overnight shelter, or couch-surfing.) Yes   Are you worried about losing your housing? No   Lack of transportation? No   Unable to get utilities (heat,electricity)? No            8/7/2024   Dental   Dentist two times every year? Yes               Today's PHQ-2 Score:       8/9/2024    11:10 AM   PHQ-2 ( 1999 [employer])   Q1: Little interest or pleasure in doing things 0   Q2: Feeling down, depressed or hopeless 0   PHQ-2 Score 0   Q1: Little interest or pleasure in doing things Not at all    Q2: Feeling down, depressed or hopeless Not at all   PHQ-2 Score 0           2024   Substance Use   Alcohol more than 3/day or more than 7/wk No   Do you use any other substances recreationally? No        Social History     Tobacco Use    Smoking status: Never     Passive exposure: Never    Smokeless tobacco: Never   Vaping Use    Vaping status: Never Used   Substance Use Topics    Alcohol use: Yes    Drug use: No          Mammogram Screening - Patient under 40 years of age: Routine Mammogram Screening not recommended.         2024   STI Screening   New sexual partner(s) since last STI/HIV test? No        History of abnormal Pap smear: No - age 30-64 HPV with reflex Pap every 5 years recommended        Latest Ref Rng & Units 2023     1:54 PM 10/21/2019    10:44 AM 10/21/2019    10:37 AM   PAP / HPV   PAP  Negative for Intraepithelial Lesion or Malignancy (NILM)      PAP (Historical)    NIL    HPV 16 DNA Negative Negative  Negative     HPV 18 DNA Negative Negative  Negative     Other HR HPV Negative Negative  Negative             2024   Contraception/Family Planning   Questions about contraception or family planning No           Reviewed and updated as needed this visit by Provider                    Past Medical History:   Diagnosis Date    Abnormal Pap smear of cervix 2008    LSIL    Cervical dysplasia 2010    LEEP    Eczema     Hip dysplasia     IUD (intrauterine device) in place 2021    Mirena     Past Surgical History:   Procedure Laterality Date    COLPOSCOPY CERVIX, BIOPSY CERVIX, ENDOCERVICAL CURETTAGE, COMBINED      moderate dysplasia    HC COLP CERVIX/UPPER VAGINA W LOOP ELEC BX CERVIX      HC INSERTION INTRAUTERINE DEVICE  ,     ParaGard, Estrellita    HC REMOVE INTRAUTERINE DEVICE  ,     HC REPAIR OF NASAL SEPTUM  2006     OB History    Para Term  AB Living   4 2 2 0 2 2   SAB IAB Ectopic Multiple Live Births   1 0 0 0 2      # Outcome Date GA  Lbr Patrick/2nd Weight Sex Type Anes PTL Lv   4 Term 08/18/21 41w0d 02:56 / 00:16 3.59 kg (7 lb 14.6 oz) M Vag-Spont EPI N CARYN      Name: JAQUELINE LANGEBOBRADEN      Apgar1: 9  Apgar5: 9   3 Term 09/09/19 40w4d 12:33 / 00:38 3.42 kg (7 lb 8.6 oz) F Vag-Spont EPI N CARYN      Name: JAQUELINE LANGEGIRL      Apgar1: 8  Apgar5: 9   2 SAB 2012     SAB      1 AB 2007     IAB        Lab work is in process  Labs reviewed in EPIC  BP Readings from Last 3 Encounters:   08/09/24 114/72   09/01/23 112/64   08/04/23 100/68    Wt Readings from Last 3 Encounters:   08/09/24 66.3 kg (146 lb 3.2 oz)   09/01/23 64.4 kg (142 lb)   08/04/23 64.4 kg (142 lb)                  Patient Active Problem List   Diagnosis    CARDIOVASCULAR SCREENING; LDL GOAL LESS THAN 160    LSIL (low grade squamous intraepithelial lesion) on Pap smear    Perioral dermatitis    Generalized anxiety disorder    Encounter for surveillance of vaginal ring hormonal contraceptive device     Past Surgical History:   Procedure Laterality Date    COLPOSCOPY CERVIX, BIOPSY CERVIX, ENDOCERVICAL CURETTAGE, COMBINED  2010    moderate dysplasia    HC COLP CERVIX/UPPER VAGINA W LOOP ELEC BX CERVIX  2010    HC INSERTION INTRAUTERINE DEVICE  2011, 2016    ParaGard, Estrellita    HC REMOVE INTRAUTERINE DEVICE  2016, 2018    HC REPAIR OF NASAL SEPTUM  2006       Social History     Tobacco Use    Smoking status: Never     Passive exposure: Never    Smokeless tobacco: Never   Substance Use Topics    Alcohol use: Yes     Family History   Problem Relation Age of Onset    Hyperlipidemia Mother     Hypertension Maternal Grandmother     Other Cancer Paternal Grandmother         Breast    Prostate Cancer Other     Diabetes No family hx of     Cancer No family hx of     Thyroid Disease No family hx of     Lupus No family hx of     Thrombophilia No family hx of     C.A.D. No family hx of     Psoriasis No family hx of     Eczema No family hx of     Melanoma No family hx of     Skin Cancer No  "family hx of          Current Outpatient Medications   Medication Sig Dispense Refill    etonogestrel-ethinyl estradiol (NUVARING) 0.12-0.015 MG/24HR vaginal ring Insert one (1) ring vaginally and leave in place for 3 consecutive weeks (21 days), then remove for 1 week. 9 each 3    sertraline (ZOLOFT) 50 MG tablet Take 1 tablet (50 mg) by mouth daily 90 tablet 3    spironolactone (ALDACTONE) 25 MG tablet Take 1 tablet (25 mg) by mouth 2 times daily 180 tablet 3    tacrolimus (PROTOPIC) 0.1 % external ointment Apply topically 2 times daily 60 g 11     No Known Allergies  Recent Labs   Lab Test 08/04/23  0925 09/30/21  1003   LDL 53 64   HDL 81 81   TRIG 41 27   ALT 7  --    CR 0.87 0.97   GFRESTIMATED 89 78   POTASSIUM 4.5 4.2   TSH 1.55 0.69          Review of Systems  CONSTITUTIONAL: NEGATIVE for fever, chills, change in weight  INTEGUMENTARY/SKIN: NEGATIVE for worrisome rashes, moles or lesions  EYES: NEGATIVE for vision changes or irritation  ENT/MOUTH: NEGATIVE for ear, mouth and throat problems  RESP: NEGATIVE for significant cough or SOB  BREAST: NEGATIVE for masses, tenderness or discharge  CV: NEGATIVE for chest pain, palpitations or peripheral edema  GI: NEGATIVE for nausea, abdominal pain, heartburn, or change in bowel habits  : NEGATIVE for frequency, dysuria, or hematuria  MUSCULOSKELETAL: NEGATIVE for significant arthralgias or myalgia  NEURO: NEGATIVE for weakness, dizziness or paresthesias  ENDOCRINE: NEGATIVE for temperature intolerance, skin/hair changes  HEME: NEGATIVE for bleeding problems  PSYCHIATRIC: NEGATIVE for changes in mood or affect     Objective    Exam  /72   Pulse 89   Temp (!) 96.6  F (35.9  C) (Temporal)   Resp 18   Ht 1.68 m (5' 6.14\")   Wt 66.3 kg (146 lb 3.2 oz)   SpO2 100%   BMI 23.50 kg/m     Estimated body mass index is 23.5 kg/m  as calculated from the following:    Height as of this encounter: 1.68 m (5' 6.14\").    Weight as of this encounter: 66.3 kg (146 lb " 3.2 oz).    Physical Exam  GENERAL: alert and no distress  EYES: Eyes grossly normal to inspection, PERRL and conjunctivae and sclerae normal  HENT: ear canals and TM's normal, nose and mouth without ulcers or lesions  NECK: no adenopathy, no asymmetry, masses, or scars  RESP: lungs clear to auscultation - no rales, rhonchi or wheezes  BREAST: Deferred per guidelines-asymptomatic per patient.  Discussed self breast exam, symptoms to watch out for and when to follow-up  CV: regular rate and rhythm, normal S1 S2, no S3 or S4, no murmur, click or rub, no peripheral edema  ABDOMEN: soft, nontender, no hepatosplenomegaly, no masses and bowel sounds normal   (female): Deferred per patient no concerns  MS: no gross musculoskeletal defects noted, no edema, no CVA tenderness  SKIN: no suspicious lesions or rashes  NEURO: Normal strength and tone, cranial nerves intact, mentation intact and speech normal  PSYCH: mentation appears normal, affect normal/bright  LYMPH: no cervical, supraclavicular adenopathy        Signed Electronically by: NAYELY GUZMAN

## 2024-08-09 NOTE — PATIENT INSTRUCTIONS
Patient Education   Preventive Care Advice   This is general advice given by our system to help you stay healthy. However, your care team may have specific advice just for you. Please talk to your care team about your preventive care needs.  Nutrition  Eat 5 or more servings of fruits and vegetables each day.  Try wheat bread, brown rice and whole grain pasta (instead of white bread, rice, and pasta).  Get enough calcium and vitamin D. Check the label on foods and aim for 100% of the RDA (recommended daily allowance).  Lifestyle  Exercise at least 150 minutes each week  (30 minutes a day, 5 days a week).  Do muscle strengthening activities 2 days a week. These help control your weight and prevent disease.  No smoking.  Wear sunscreen to prevent skin cancer.  Have a dental exam and cleaning every 6 months.  Yearly exams  See your health care team every year to talk about:  Any changes in your health.  Any medicines your care team has prescribed.  Preventive care, family planning, and ways to prevent chronic diseases.  Shots (vaccines)   HPV shots (up to age 26), if you've never had them before.  Hepatitis B shots (up to age 59), if you've never had them before.  COVID-19 shot: Get this shot when it's due.  Flu shot: Get a flu shot every year.  Tetanus shot: Get a tetanus shot every 10 years.  Pneumococcal, hepatitis A, and RSV shots: Ask your care team if you need these based on your risk.  Shingles shot (for age 50 and up)  General health tests  Diabetes screening:  Starting at age 35, Get screened for diabetes at least every 3 years.  If you are younger than age 35, ask your care team if you should be screened for diabetes.  Cholesterol test: At age 39, start having a cholesterol test every 5 years, or more often if advised.  Bone density scan (DEXA): At age 50, ask your care team if you should have this scan for osteoporosis (brittle bones).  Hepatitis C: Get tested at least once in your life.  STIs (sexually  transmitted infections)  Before age 24: Ask your care team if you should be screened for STIs.  After age 24: Get screened for STIs if you're at risk. You are at risk for STIs (including HIV) if:  You are sexually active with more than one person.  You don't use condoms every time.  You or a partner was diagnosed with a sexually transmitted infection.  If you are at risk for HIV, ask about PrEP medicine to prevent HIV.  Get tested for HIV at least once in your life, whether you are at risk for HIV or not.  Cancer screening tests  Cervical cancer screening: If you have a cervix, begin getting regular cervical cancer screening tests starting at age 21.  Breast cancer scan (mammogram): If you've ever had breasts, begin having regular mammograms starting at age 40. This is a scan to check for breast cancer.  Colon cancer screening: It is important to start screening for colon cancer at age 45.  Have a colonoscopy test every 10 years (or more often if you're at risk) Or, ask your provider about stool tests like a FIT test every year or Cologuard test every 3 years.  To learn more about your testing options, visit:   .  For help making a decision, visit:   https://bit.ly/di83486.  Prostate cancer screening test: If you have a prostate, ask your care team if a prostate cancer screening test (PSA) at age 55 is right for you.  Lung cancer screening: If you are a current or former smoker ages 50 to 80, ask your care team if ongoing lung cancer screenings are right for you.  For informational purposes only. Not to replace the advice of your health care provider. Copyright   2023 [employer]. All rights reserved. Clinically reviewed by the [facility] Transitions Program. [employer] 294197 - REV 01/24.

## 2024-08-21 ENCOUNTER — MYC MEDICAL ADVICE (OUTPATIENT)
Dept: FAMILY MEDICINE | Facility: CLINIC | Age: 34
End: 2024-08-21
Payer: COMMERCIAL

## 2024-08-21 DIAGNOSIS — F41.1 GENERALIZED ANXIETY DISORDER: Primary | ICD-10-CM

## 2024-09-18 ENCOUNTER — TELEPHONE (OUTPATIENT)
Dept: DERMATOLOGY | Facility: CLINIC | Age: 34
End: 2024-09-18
Payer: COMMERCIAL

## 2024-09-18 NOTE — TELEPHONE ENCOUNTER
9/8 Left Voicemail (1st Attempt) and Sent Mychart (1st Attempt) for the patient to call back and schedule the following:    Appointment type: Return Dermatology  Provider: Marilin  Return date: 7/15/2025  Specialty phone number: [phone]   Additional appointment(s) needed: n/a  Additonal Notes: n/a

## 2024-09-24 ENCOUNTER — TELEPHONE (OUTPATIENT)
Dept: DERMATOLOGY | Facility: CLINIC | Age: 34
End: 2024-09-24
Payer: COMMERCIAL

## 2024-09-24 NOTE — TELEPHONE ENCOUNTER
Left Voicemail (1st Attempt) for the patient to call back and schedule the following:    Appointment type: FOLLOW UP    Provider: Javier    Return date: 07/2025     Specialty phone number: [phone]    Additonal Notes: na

## 2024-12-11 ENCOUNTER — TELEPHONE (OUTPATIENT)
Dept: DERMATOLOGY | Facility: CLINIC | Age: 34
End: 2024-12-11
Payer: COMMERCIAL

## 2024-12-11 NOTE — TELEPHONE ENCOUNTER
Left Voicemail (2nd Attempt) and Sent Mychart (2nd Attempt) for the patient to call back and schedule the following:    Appointment type: Return Dermatology  Provider: Marilin  Return date: 7/15/2025  Specialty phone number: [phone]   Additional appointment(s) needed: n/a  Additonal Notes: n/a       Brandi Snow on 12/11/2024 at 4:41 PM

## 2025-07-21 DIAGNOSIS — L71.0 PERIORAL DERMATITIS: ICD-10-CM

## 2025-07-24 RX ORDER — TACROLIMUS 1 MG/G
OINTMENT TOPICAL 2 TIMES DAILY
Qty: 60 G | Refills: 11 | OUTPATIENT
Start: 2025-07-24

## 2025-07-24 NOTE — TELEPHONE ENCOUNTER
tacrolimus (PROTOPIC) 0.1 % external ointment   Start 7/15/24  Disp  60 g  R 11  Apply topically 2 times daily - Topical     Heather Gaston MD  Dermatology    7/15/24  Nv  none    Follow-up: 6 month(s) in-person, or earlier for new or changing lesions     Scheduling has been notified to contact the pt for appointment.    Process 2  Refused. Needs appt. Lm on pharm vm.

## 2025-08-31 DIAGNOSIS — F41.1 GENERALIZED ANXIETY DISORDER: ICD-10-CM

## 2025-08-31 DIAGNOSIS — L71.0 PERIORAL DERMATITIS: ICD-10-CM

## 2025-08-31 RX ORDER — SPIRONOLACTONE 25 MG/1
25 TABLET ORAL 2 TIMES DAILY
Qty: 180 TABLET | Refills: 0 | Status: SHIPPED | OUTPATIENT
Start: 2025-08-31